# Patient Record
Sex: FEMALE | Race: WHITE | Employment: STUDENT | URBAN - METROPOLITAN AREA
[De-identification: names, ages, dates, MRNs, and addresses within clinical notes are randomized per-mention and may not be internally consistent; named-entity substitution may affect disease eponyms.]

---

## 2023-12-20 ENCOUNTER — TELEPHONE (OUTPATIENT)
Dept: RHEUMATOLOGY | Facility: CLINIC | Age: 23
End: 2023-12-20

## 2023-12-20 NOTE — TELEPHONE ENCOUNTER
Phoned Dr. Kerrie Barlow office to clarify reason for urgent referral to see Dr. Shaan Stephens. Message taken from [facility], will have provider office call our office to address.

## 2023-12-21 NOTE — TELEPHONE ENCOUNTER
Call back from Dr. Malia Weathers office. States that referral is non urgent. Reason for referral is body aches to midsection. Labs and imaging WNL.

## 2024-01-26 ENCOUNTER — OFFICE VISIT (OUTPATIENT)
Dept: RHEUMATOLOGY | Facility: CLINIC | Age: 24
End: 2024-01-26
Payer: COMMERCIAL

## 2024-01-26 VITALS
WEIGHT: 114.19 LBS | HEIGHT: 62.75 IN | OXYGEN SATURATION: 99 % | TEMPERATURE: 97 F | BODY MASS INDEX: 20.49 KG/M2 | HEART RATE: 90 BPM | RESPIRATION RATE: 16 BRPM | DIASTOLIC BLOOD PRESSURE: 68 MMHG | SYSTOLIC BLOOD PRESSURE: 110 MMHG

## 2024-01-26 DIAGNOSIS — M79.7 FIBROMYALGIA: ICD-10-CM

## 2024-01-26 DIAGNOSIS — Z86.19 FREQUENT INFECTIONS: ICD-10-CM

## 2024-01-26 DIAGNOSIS — Z72.820 POOR SLEEP: ICD-10-CM

## 2024-01-26 DIAGNOSIS — M25.50 POLYARTHRALGIA: Primary | ICD-10-CM

## 2024-01-26 DIAGNOSIS — E55.9 VITAMIN D DEFICIENCY: ICD-10-CM

## 2024-01-26 DIAGNOSIS — R53.82 CHRONIC FATIGUE: ICD-10-CM

## 2024-01-26 DIAGNOSIS — R29.3 POOR POSTURE: ICD-10-CM

## 2024-01-26 RX ORDER — BUPROPION HYDROCHLORIDE 150 MG/1
150 TABLET ORAL DAILY
COMMUNITY
Start: 2022-05-29 | End: 2024-01-26

## 2024-01-26 RX ORDER — ALBUTEROL SULFATE 90 UG/1
2 AEROSOL, METERED RESPIRATORY (INHALATION)
COMMUNITY
Start: 2023-11-27 | End: 2024-01-26

## 2024-01-26 RX ORDER — GABAPENTIN 300 MG/1
1 CAPSULE ORAL 3 TIMES DAILY
COMMUNITY
Start: 2023-02-17 | End: 2024-01-26

## 2024-01-26 RX ORDER — CYCLOBENZAPRINE HCL 10 MG
10 TABLET ORAL 2 TIMES DAILY PRN
COMMUNITY
Start: 2024-01-16

## 2024-01-26 RX ORDER — PREDNISONE 20 MG/1
20 TABLET ORAL 2 TIMES DAILY
COMMUNITY
Start: 2023-11-22 | End: 2024-01-26

## 2024-01-26 RX ORDER — NORETHINDRONE ACETATE AND ETHINYL ESTRADIOL 1MG-20(21)
KIT ORAL
COMMUNITY
End: 2024-01-26

## 2024-01-26 RX ORDER — FLUTICASONE PROPIONATE 50 MCG
SPRAY, SUSPENSION (ML) NASAL
COMMUNITY

## 2024-01-26 RX ORDER — GABAPENTIN 600 MG/1
1 TABLET ORAL 3 TIMES DAILY
COMMUNITY
Start: 2023-06-13 | End: 2024-01-26

## 2024-01-26 RX ORDER — PROPRANOLOL HYDROCHLORIDE 10 MG/1
10 TABLET ORAL 2 TIMES DAILY
COMMUNITY
Start: 2024-01-16

## 2024-01-26 RX ORDER — MELOXICAM 15 MG/1
15 TABLET ORAL
COMMUNITY
End: 2024-01-26

## 2024-01-26 RX ORDER — DEXTROAMPHETAMINE SACCHARATE, AMPHETAMINE ASPARTATE, DEXTROAMPHETAMINE SULFATE AND AMPHETAMINE SULFATE 5; 5; 5; 5 MG/1; MG/1; MG/1; MG/1
20 TABLET ORAL
COMMUNITY
Start: 2022-04-08

## 2024-01-26 RX ORDER — TOPIRAMATE 25 MG/1
25 TABLET ORAL NIGHTLY PRN
COMMUNITY
Start: 2023-06-13 | End: 2024-01-26

## 2024-01-26 RX ORDER — NAPROXEN 500 MG/1
500 TABLET ORAL
COMMUNITY
Start: 2023-12-18 | End: 2024-01-26

## 2024-01-26 NOTE — PROGRESS NOTES
?  RHEUMATOLOGY NEW PATIENT   Date of visit: 1/26/2024  ?  Chief Complaint   Patient presents with    New Patient     New patient referred by Derek Soto. Pt states at around 10 or 11 yrs of age she started having rt knee pain. She now has pain in both knees, hips, her spine and lt shoulder. She also has pain in her pinky and thumb on her lt hand. She had an EMG but was told it was normal. She has done PT with no relief. She states she also has a lot of fatigue. Pt is adopted and does not know her family history.        ?  ASSESSMENT, DISCUSSION & PLAN   Assessment:  1. Polyarthralgia    2. Chronic fatigue    3. Vitamin D deficiency    4. Fibromyalgia    5. Frequent infections    6. Poor posture    7. Poor sleep        Discussion:  Ms. Serina Fay is a 24 yo woman with long standing history of pain and fatigue. Seems like she has has negative work up in the past but does have areas of arthritis out of proportion for her age. She does have clear signs of fibromyalgia but discussed that this may not explain all of her symptoms.   Will have her get updated serologies as well as work up for the fatigue.   She would benefit from a sleep study.   She would also benefit from PT at least for posture training.   She is not currently on antidepressants so will consider this depending on test results. Would like to avoid gabapentin if able since previously required very high doses for improvement of symptoms.   She will need follow up to discuss result and next steps.     Discussed briefly the non medication treatment modalities for fibromyalgia.  Stressed importance of therapy as well as psych follow-up with medication intervention for any underlying depression/anxiety/PTSD.  Stressed importance of proper sleep of 6 to 8 hours nightly.  Reminded patient of healthy sleep hygiene.  Also stressed the importance of regular exercise including 30 minutes at least 5 times per week of stretching.  Exercises such as zoe chi can be  helpful for this and have been studied to be effective for fibromyalgia patients.  Dedicated physical therapy for fibromyalgia may be beneficial for patient.    Patient verbalized understanding of above instructions. No further questions at this time.    Code selection for this visit was based on time spent (60min) on date of service in preparing to see the patient, obtaining and/or reviewing separately obtained history, performing a medically appropriate examination, counseling and educating the patient/family/caregiver, ordering medications or testing, referring and communicating with other healthcare providers, documenting clinical information in the E HR, independently interpreting results and communicating results to the patient/family/caregiver and care coordination with the patient's other providers.    ?  Plan:  Diagnoses and all orders for this visit:    Polyarthralgia  -     CBC With Differential With Platelet; Future  -     Comp Metabolic Panel (14); Future  -     C-Reactive Protein; Future  -     Sed Rate, Westergren (Automated); Future  -     Vitamin D; Future  -     TSH W Reflex To Free T4; Future  -     Vitamin B12; Future  -     Iron And Tibc; Future  -     Ferritin; Future  -     Immunoglobulin A/G/M, Quant; Future  -     ANAlyzeR SERG, IFA with Reflex Titer/Pattern, Systemic Autoimmune Panel 1 [[id number]] [Q]    Chronic fatigue  -     CBC With Differential With Platelet; Future  -     Comp Metabolic Panel (14); Future  -     C-Reactive Protein; Future  -     Sed Rate, Westergren (Automated); Future  -     Vitamin D; Future  -     TSH W Reflex To Free T4; Future  -     Vitamin B12; Future  -     Iron And Tibc; Future  -     Ferritin; Future  -     Immunoglobulin A/G/M, Quant; Future  -     ANAlyzeR SERG, IFA with Reflex Titer/Pattern, Systemic Autoimmune Panel 1 [[id number]] [Q]    Vitamin D deficiency  -     Vitamin D; Future    Fibromyalgia    Frequent infections  -     Immunoglobulin A/G/M, Quant;  Future    Poor posture    Poor sleep        Return in about 2 weeks (around 2/9/2024).  ?  HPI   Serina Fay is a 23 year old female with the following active problems who is referred for rheumatologic evaluation due to pain.     Was hospitalized when sophomore in high school due to severity pain. Was given morphine and felt like pain had gone away and when out of system, pain returned.    States starting in 4th grade, she had sudden onset of pain in the right knee. Told at that time, was tendonitis. But had difficulty walking due to the pain. Would be crawling around her house. Did PT and had some improvement of ability to walk but no improvement of the pain    Then developed bilateral hip pain, left worse than the right  Both knees hurt and can get right knee swelling.     Did previously play water polo, gymnastics and drums in marching band for years.   States symptoms exacerbated with diet, exercise and mental health changes.    Left arm and left side of back nerve pain  + migraines (started about 2 years ago), can get numbness/tingling on the entire left side, even her tongue   + blurred vision and difficulty with eyes focusing- eye exams have been normal. MRI of brain normal except in the past scattered cortical intensities  + prior EEG showing cerebral dysfunction- after covid infection  + left shoulder/back neck pain- had MRI done and told by spine surgeon that pain not related to findings.   + sensitivity over the nose with light touch. Possible slight curvature of the nose but other work up negative. Some bruising over the bridge and possible swelling.   + left wrist pain and weakness     Has been dx with chronic fatigue and polyarthralgia.   Hx of covid infection in 2022 - getting diaphragm spasms, trouble swallowing and sensation of throat being tight, some pain with swallowing.     Works for [employer] and is on light duty (<10#). Tries to avoid repetitive motions.     + significant fatigue despite how much  she sleeps or drinks caffeine. Feels like she tires easily. Never wakes up feeling rest.   + gets injuries easily. Denies increased flexibility and feels the opposite.   + gets a throbbing in her legs primarily but felt all over.     Often cracks her shoulder/neck for relief  Also has TMJ but has not been able to afford the treatment . Hx of bruxism     + can get pinky and thumb swelling  + ankle with hx of rolling/trauma  Hx of trauma to the right foot.  Hx of costochondritis    Previously given gabapentin and helped. But concerned with taking long term and side effects. ( Was on 600mg TID, did not feel an improvement with the 300mg dosage).  Also previously given meloxicam, not currently taking. Not sure if made a difference.  Feels naproxen helped better when taking (took BID).   Would take a lot of ibuprofen and tylenol when younger due to the pain (took about 4-8/day)     Hx of depression, anxiety as well as C-PTSD. Was adopted around 4y. Was hospitalized after birth due to birth mother drug abuse.  encephalopathy from what she can gather from records.     + hair loss/thinning  + oral canker sores- improved, thinks related to food   + intermittent discoloration of the toes/feet  + gets cold easily   + constipation, never had formal GI workup   + hives over neck   + brittle nails   + dry eyes per pt (attributes to adderall), uses contact drops and allergy eye drops sometimes   + dry mouth  (attributes to adderall), uses mints   + gets a gagging response randomly- denies association with certain foods or anxiety   + gets sick easily   + LOC due to severity of pain     The patient denies nasal ulcers, photosensitive rash, elevated or scarring rashes, Raynaud's phenomenon, prior hematologic abnormality, prior renal or liver disease, or history of seizures.  Denies hx of pericarditis or pleuritis. ? Right axis deviation and \"extra chord\" per pt   No history of prior blood clot in the legs or lungs, strokes  or ischemic phenomenon.  Denies nonhealing ulcers on the fingertips.  Denies heart burn.  The patient denies any history of uveitis, crampy abdominal pain, bloating, diarrhea, bloody stools, nodular painful shin bruises, Achilles heel pain or symptoms of enthesitis, psoriatic lesions, spooning or pitting of the nails.      No fevers, chills, lymphadenopathy, night sweats, unexpected weight loss, easy bruising or bleeding, or unexplained weakness.      Family hx unknown since adopted     Past Medical History:  Past Medical History:   Diagnosis Date    ADHD     Costochondritis     Migraine     OCD (obsessive compulsive disorder)     PTSD (post-traumatic stress disorder)      Past Surgical History:  Past Surgical History:   Procedure Laterality Date    OTHER SURGICAL HISTORY      wisdom teeth     Family History:  History reviewed. No pertinent family history.  Social History:  Social History     Socioeconomic History    Marital status: Single   Tobacco Use    Smoking status: Never   Vaping Use    Vaping Use: Former    Substances: Nicotine   Substance and Sexual Activity    Alcohol use: Never    Drug use: Never     Medications:  Outpatient Medications Marked as Taking for the 1/26/24 encounter (Office Visit) with Anna Marie Elizabeth DO   Medication Sig Dispense Refill    amphetamine-dextroamphetamine 20 MG Oral Tab Take 1 tablet (20 mg total) by mouth.      cyclobenzaprine 10 MG Oral Tab Take 1 tablet (10 mg total) by mouth 2 (two) times daily as needed.      fluticasone propionate 50 MCG/ACT Nasal Suspension       propranolol 10 MG Oral Tab Take 1 tablet (10 mg total) by mouth 2 (two) times daily.      ISIBLOOM 0.15-30 MG-MCG Oral Tab        Modified Medications    No medications on file     Medications Discontinued During This Encounter   Medication Reason    albuterol 108 (90 Base) MCG/ACT Inhalation Aero Soln     buPROPion  MG Oral Tablet 24 Hr     gabapentin 300 MG Oral Cap     gabapentin 600 MG Oral Tab      Meloxicam 15 MG Oral Tab     naproxen 500 MG Oral Tab     Norethin Ace-Eth Estrad-FE 1-20 MG-MCG Oral Tab     predniSONE 20 MG Oral Tab     topiramate 25 MG Oral Tab      ?  ?  Allergies:  No Known Allergies  ?  REVIEW OF SYSTEMS   ?  Review of Systems   Constitutional:  Positive for chills and malaise/fatigue. Negative for fever and weight loss.   HENT:  Positive for congestion. Negative for nosebleeds.    Eyes:  Positive for blurred vision and pain. Negative for redness.   Respiratory:  Positive for shortness of breath. Negative for cough and hemoptysis.    Cardiovascular:  Positive for chest pain and palpitations. Negative for leg swelling.   Gastrointestinal:  Positive for constipation. Negative for abdominal pain, blood in stool, diarrhea, heartburn and nausea.   Genitourinary:  Positive for frequency. Negative for dysuria, hematuria and urgency.   Musculoskeletal:  Positive for back pain, joint pain, myalgias and neck pain.   Skin:  Negative for itching and rash.   Neurological:  Positive for dizziness, tingling, loss of consciousness, weakness and headaches. Negative for seizures.   Endo/Heme/Allergies:  Positive for environmental allergies. Does not bruise/bleed easily.   Psychiatric/Behavioral:  Positive for depression. The patient is nervous/anxious.      PHYSICAL EXAM   Today's Vitals:  Temperature Blood Pressure Heart Rate Resp Rate SpO2   Temp: 97.4 °F (36.3 °C) BP: 110/68 Pulse: 90 Resp: 16 SpO2: 99 %   ?  Current Weight Height BMI BSA Pain   Wt Readings from Last 1 Encounters:   01/26/24 114 lb 3.2 oz (51.8 kg)    Height: 5' 2.75\" (159.4 cm) Body mass index is 20.39 kg/m². Body surface area is 1.52 meters squared.         Physical Exam  Vitals and nursing note reviewed.   Constitutional:       General: She is not in acute distress.     Appearance: Normal appearance. She is well-developed. She is not diaphoretic.   HENT:      Head: Normocephalic.   Eyes:      General: No scleral icterus.      Conjunctiva/sclera: Conjunctivae normal.   Neck:      Vascular: No JVD.      Trachea: No tracheal deviation.   Cardiovascular:      Rate and Rhythm: Normal rate and regular rhythm.      Heart sounds: Normal heart sounds. No murmur heard.  Pulmonary:      Effort: Pulmonary effort is normal. No respiratory distress.      Breath sounds: Normal breath sounds. No wheezing.   Musculoskeletal:         General: Tenderness present. No swelling or deformity.      Cervical back: Neck supple.      Comments: No evidence of heberden or ann nodes of any of the fingers, no basilar joint tenderness of the 1st CMC bilaterally.  No swelling, tenderness, redness or restriction of motion of the DIPs, PIPs, MCPs, wrists, elbows, ankles, or joints of the feet.  Bilateral shoulders with full ROM, no evidence of impingement with provocative maneuvers.  Bilateral knees without medial joint line tenderness, no crepitus, no effusion.  Pan positive fibro tender points  Significant poor posture   Lymphadenopathy:      Cervical: No cervical adenopathy.   Skin:     General: Skin is warm and dry.      Findings: No erythema or rash.      Comments: No malar rash  No periungal erythema  No fingernail pitting   Neurological:      Mental Status: She is alert and oriented to person, place, and time.      Cranial Nerves: No cranial nerve deficit.      Gait: Gait normal.   Psychiatric:         Behavior: Behavior normal.       ?  Radiology review:      Martín Koch MD - 02/28/2023  Formatting of this note might be different from the original.  EXAM:  MRI CERVICAL SPINE WITHOUT IV CONTRAST, 2/28/2023 8:32 am    HISTORY:  Cervical radiculopathy.    COMPARISON:  None    TECHNIQUE:  Non-contrast multiplanar multi-sequence MRI of the cervical spine.    FINDINGS:  The craniovertebral junction and C1-C2 articulation show preserved alignment. Spinal alignment is maintained. Vertebral body heights are normal. No suspicious marrow lesions. No restricted  diffusion. Spinal cord is unremarkable. No high-grade narrowing of the spinal canal or neural foramina at any level.    Early disc degeneration at C5-C6 with minimal disc height loss and disc bulge. Minimal spinal canal stenosis at this level. Perivertebral soft tissues are within normal limits. Flow voids are preserved.    IMPRESSION:    Early disc degeneration at C5-C6 with minimal disc bulge at this level. No high-grade narrowing of the spinal canal or neural foramina at any level. The spinal cord is unremarkable.      Attending: Martín Koch on 02/28/2023 8:39 AM  Exam End: 02/28/23  8:32 AM    Specimen Collected: 02/28/23  8:32 AM      EXAM:   MRI SHOULDER LEFT WITHOUT IV CONTRAST, 2/28/2023 8:32 am     HISTORY:   Cervical radiculopathy  Radiculopathy, cervical region     COMPARISON:   Cervical radiculopathy.     TECHNIQUE:   Multi-planar multi-sequence images were obtained of the left shoulder without contrast.     FINDINGS:   No evidence of os acromiale or Hill-Sachs lesion.     Supraspinatus, infraspinatus, teres minor, and subscapularis tendons are intact.     Biceps tendon is located within the bicipital groove and appears intact.     Small osteophytes are present in the AC joint. No bursitis is seen.     No loss of muscle bulk or fatty atrophy of the rotator cuff musculature.     Labrum is not optimally evaluated on this non-arthrographic study; however, no gross labral abnormalities are seen.     No significant joint effusion.     Further evaluation of degenerative changes are better evaluated on same-day MRI cervical spine.     Examination: MRI of the right knee without IV contrast   Clinical indication: Right knee pain   Comparison: None   Technique: MRI of the right knee was performed with axial T2 fat saturated   sequence, sagittal T1, proton density, and T2 fat-saturated sequences, and   coronal T2 fat-saturated and proton density weighted sequences.   Findings: Patellofemoral articular cartilage is  mildly heterogenous at the   patellar apex, consistent with mild chondromalacia. The extensor mechanism of   the knee is intact. There is a small joint effusion. Mild edema is noted in the   anterior quadriceps fat pad. There is mild edema surrounding the infrapatellar   plica. A small amount of fluid is noted along the deep aspect of the patellar   tendon. No Blum's cyst is present.   The biceps femoris tendon, lateral ligamentous complex, popliteus tendon,   iliotibial band, ACL, PCL, and MCL are intact.   The medial meniscus is intact. Medial compartment articular cartilage is   unremarkable.   The lateral meniscus is intact. Lateral compartment articular cartilage is   unremarkable.   Impression:   1. Findings compatible with mild anterior quadriceps fat pad impingement and   mild infrapatellar plica sprain.   2. Mild chondromalacia patella.   3. Small joint effusion.   Dictating [place] 01/20/2020 07:03     Labs:  Lab Results   Component Value Date    WBC 7.34 03/05/2016    RBC 4.65 03/05/2016    HGB 12.7 03/05/2016    HCT 39.2 03/05/2016     03/05/2016    MCV 84.3 03/05/2016    MCH 27.3 03/05/2016    MCHC 32.4 03/05/2016    RDW 13.6 03/05/2016    NEPERCENT 57.3 03/05/2016    LYPERCENT 34.7 03/05/2016    MOPERCENT 5.3 03/05/2016    EOPERCENT 2.3 03/05/2016    BAPERCENT 0.4 03/05/2016    NE 4.20 03/05/2016    LYMABS 2.55 03/05/2016    MOABSO 0.39 03/05/2016    EOABSO 0.17 03/05/2016    BAABSO 0.03 03/05/2016     Lab Results   Component Value Date    GLU 85 03/05/2016    BUN 14 03/05/2016    CREATSERUM 0.60 03/05/2016    CA 9.3 03/05/2016    ALKPHO 110 03/05/2016    AST 18 03/05/2016    ALT 23 03/05/2016    BILT 0.23 03/05/2016    TP 7.8 03/05/2016    ALB 4.5 03/05/2016     03/05/2016    K 4.5 03/05/2016     03/05/2016    CO2 27 03/05/2016       Additional Labs:    12/2023  ESR 6 normal  CRP normal     02/2020  Uric acid 3.9 normal  ESR 6 normal   CRP normal   RF  negative  CCP negative  HLAB27 negative  Lyme negative     03/2016  RF negative  SERG screen negative  ESR 7 normal    Anna Marie Clara, DO  [facility]  1/26/2024

## 2024-02-05 LAB
% SATURATION: 24 % (CALC) (ref 16–45)
ABSOLUTE BASOPHILS: 48 CELLS/UL (ref 0–200)
ABSOLUTE EOSINOPHILS: 110 CELLS/UL (ref 15–500)
ABSOLUTE LYMPHOCYTES: 2429 CELLS/UL (ref 850–3900)
ABSOLUTE MONOCYTES: 559 CELLS/UL (ref 200–950)
ABSOLUTE NEUTROPHILS: 3754 CELLS/UL (ref 1500–7800)
ALBUMIN/GLOBULIN RATIO: 1.6 (CALC) (ref 1–2.5)
ALBUMIN: 4.3 G/DL (ref 3.6–5.1)
ALKALINE PHOSPHATASE: 51 U/L (ref 31–125)
ALT: 13 U/L (ref 6–29)
AST: 23 U/L (ref 10–30)
BASOPHILS: 0.7 %
BILIRUBIN, TOTAL: 0.3 MG/DL (ref 0.2–1.2)
BUN: 14 MG/DL (ref 7–25)
C-REACTIVE PROTEIN: 0.9 MG/L
CALCIUM: 9.2 MG/DL (ref 8.6–10.2)
CARBON DIOXIDE: 25 MMOL/L (ref 20–32)
CHLORIDE: 104 MMOL/L (ref 98–110)
CREATININE: 0.65 MG/DL (ref 0.5–0.96)
EGFR: 128 ML/MIN/1.73M2
EOSINOPHILS: 1.6 %
FERRITIN: 57 NG/ML (ref 16–154)
GLOBULIN: 2.7 G/DL (CALC) (ref 1.9–3.7)
GLUCOSE: 94 MG/DL (ref 65–139)
HEMATOCRIT: 36.5 % (ref 35–45)
HEMOGLOBIN: 12.6 G/DL (ref 11.7–15.5)
IMMUNOGLOBULIN A: 129 MG/DL (ref 47–310)
IMMUNOGLOBULIN G: 935 MG/DL (ref 600–1640)
IMMUNOGLOBULIN M: 127 MG/DL (ref 50–300)
IRON BINDING CAPACITY: 455 MCG/DL (CALC) (ref 250–450)
IRON, TOTAL: 108 MCG/DL (ref 40–190)
LYMPHOCYTES: 35.2 %
MCH: 29.4 PG (ref 27–33)
MCHC: 34.5 G/DL (ref 32–36)
MCV: 85.3 FL (ref 80–100)
MONOCYTES: 8.1 %
MPV: 11.2 FL (ref 7.5–12.5)
NEUTROPHILS: 54.4 %
PLATELET COUNT: 238 THOUSAND/UL (ref 140–400)
POTASSIUM: 3.5 MMOL/L (ref 3.5–5.3)
PROTEIN, TOTAL: 7 G/DL (ref 6.1–8.1)
RDW: 12.2 % (ref 11–15)
RED BLOOD CELL COUNT: 4.28 MILLION/UL (ref 3.8–5.1)
SED RATE BY MODIFIED$WESTERGREN: 2 MM/H
SODIUM: 137 MMOL/L (ref 135–146)
TSH W/REFLEX TO FT4: 0.98 MIU/L
VITAMIN B12: 471 PG/ML (ref 200–1100)
WHITE BLOOD CELL COUNT: 6.9 THOUSAND/UL (ref 3.8–10.8)

## 2024-08-05 ENCOUNTER — OFFICE VISIT (OUTPATIENT)
Dept: RHEUMATOLOGY | Facility: CLINIC | Age: 24
End: 2024-08-05
Payer: COMMERCIAL

## 2024-08-05 VITALS
SYSTOLIC BLOOD PRESSURE: 116 MMHG | OXYGEN SATURATION: 99 % | BODY MASS INDEX: 19.14 KG/M2 | HEART RATE: 84 BPM | RESPIRATION RATE: 16 BRPM | WEIGHT: 108 LBS | HEIGHT: 63 IN | TEMPERATURE: 98 F | DIASTOLIC BLOOD PRESSURE: 80 MMHG

## 2024-08-05 DIAGNOSIS — E55.9 VITAMIN D DEFICIENCY: ICD-10-CM

## 2024-08-05 DIAGNOSIS — R93.6 ABNORMAL X-RAY OF EXTREMITY: ICD-10-CM

## 2024-08-05 DIAGNOSIS — M79.601 RIGHT ARM PAIN: ICD-10-CM

## 2024-08-05 DIAGNOSIS — M25.50 POLYARTHRALGIA: ICD-10-CM

## 2024-08-05 DIAGNOSIS — M79.7 FIBROMYALGIA: Primary | ICD-10-CM

## 2024-08-05 DIAGNOSIS — R53.82 CHRONIC FATIGUE: ICD-10-CM

## 2024-08-05 PROCEDURE — 3079F DIAST BP 80-89 MM HG: CPT | Performed by: INTERNAL MEDICINE

## 2024-08-05 PROCEDURE — 3074F SYST BP LT 130 MM HG: CPT | Performed by: INTERNAL MEDICINE

## 2024-08-05 PROCEDURE — 3008F BODY MASS INDEX DOCD: CPT | Performed by: INTERNAL MEDICINE

## 2024-08-05 PROCEDURE — 99215 OFFICE O/P EST HI 40 MIN: CPT | Performed by: INTERNAL MEDICINE

## 2024-08-05 RX ORDER — ERGOCALCIFEROL 1.25 MG/1
50000 CAPSULE ORAL WEEKLY
Qty: 12 CAPSULE | Refills: 0 | Status: SHIPPED | OUTPATIENT
Start: 2024-08-05 | End: 2024-10-28

## 2024-08-05 NOTE — PROGRESS NOTES
?  RHEUMATOLOGY FOLLOW UP   Date of visit: 08/05/2024  ?  Chief Complaint   Patient presents with    Follow - Up     6 month f/u. Feeling about the same. Symptoms come and go. Now having some rashes that come and go as well. Converted rapid score of 4.3       ?  ASSESSMENT, DISCUSSION & PLAN   Assessment:  1. Fibromyalgia    2. Vitamin D deficiency    3. Polyarthralgia    4. Chronic fatigue    5. Right arm pain    6. Abnormal x-ray of extremity          Discussion:  Ms. Serina Fay is a 24 yo woman with long standing history of pain and fatigue. Seems like she has has negative work up in the past but does have areas of arthritis out of proportion for her age. She does have clear signs of fibromyalgia but discussed that this may not explain all of her symptoms.   Her serology showed an SERG that was positive but with a pattern that is seen in false positivity/normal population.  Remainder of JUSTINO panel was negative.  The remainder of her labs were also normal including inflammatory markers, complements, rheumatoid studies as well as inflammatory markers.  Reminded patient again that she likely suffers from fibromyalgia compounded by depression and anxiety which is likely contributing to at least some of her symptoms.  Does not account for the redness over her body in various points.  Question of Raynaud's versus erythromelalgia versus other etiology.  I strongly recommended that she consider treatment for the underlying mood disorder which may help with her chronic pain as well as fatigue.  She also has a horrible sleep schedule due to managing work and school.  Recommended she consider hydroxyzine which may help with some of the anxiety and her sleep versus fluoxetine which can help with some of the redness, mood disorder as well as potentially some of her pain.  Will send her a "[employer]t message with options for GI, ENT, neurology for evaluation of POTS/autonomic dysfunction as well as to orthopedic for her wrist  concerns.  She reports an abnormal x-ray of her humerus which has been there for some time so we will order specific humeral x-rays.    She will keep me updated after discussing her options further with her psychiatrist.   Otherwise, okay to follow up in 6-7 months or sooner as needed.     Discussed briefly the non medication treatment modalities for fibromyalgia.  Stressed importance of therapy as well as psych follow-up with medication intervention for any underlying depression/anxiety/PTSD.  Stressed importance of proper sleep of 6 to 8 hours nightly.  Reminded patient of healthy sleep hygiene.  Also stressed the importance of regular exercise including 30 minutes at least 5 times per week of stretching.  Exercises such as zoe chi can be helpful for this and have been studied to be effective for fibromyalgia patients.  Dedicated physical therapy for fibromyalgia may be beneficial for patient.    Patient verbalized understanding of above instructions. No further questions at this time.    Code selection for this visit was based on time spent (45min) on date of service in preparing to see the patient, obtaining and/or reviewing separately obtained history, performing a medically appropriate examination, counseling and educating the patient/family/caregiver, ordering medications or testing, referring and communicating with other healthcare providers, documenting clinical information in the E HR, independently interpreting results and communicating results to the patient/family/caregiver and care coordination with the patient's other providers.    ?  Plan:  Diagnoses and all orders for this visit:    Fibromyalgia  -     CBC With Differential With Platelet; Future  -     Comp Metabolic Panel (14); Future  -     C-Reactive Protein; Future  -     Sed Rate, Westergren (Automated); Future  -     Rheumatoid Arthritis Factor; Future  -     Myositis Antibody Comprehensive Panel [E]; Future  -     CK (Creatine Kinase) (Not  Creatinine); Future    Vitamin D deficiency  -     Vitamin D; Future  -     ergocalciferol 1.25 MG (34329 UT) Oral Cap; Take 1 capsule (50,000 Units total) by mouth once a week.    Polyarthralgia  -     CBC With Differential With Platelet; Future  -     Comp Metabolic Panel (14); Future  -     C-Reactive Protein; Future  -     Sed Rate, Westergren (Automated); Future  -     Rheumatoid Arthritis Factor; Future  -     Myositis Antibody Comprehensive Panel [E]; Future  -     CK (Creatine Kinase) (Not Creatinine); Future    Chronic fatigue  -     CBC With Differential With Platelet; Future  -     Comp Metabolic Panel (14); Future  -     C-Reactive Protein; Future  -     Sed Rate, Westergren (Automated); Future  -     Rheumatoid Arthritis Factor; Future  -     Myositis Antibody Comprehensive Panel [E]; Future  -     CK (Creatine Kinase) (Not Creatinine); Future    Right arm pain  -     XR HUMERUS (MIN 2 VIEWS), RIGHT (CPT=73060); Future    Abnormal x-ray of extremity  -     XR HUMERUS (MIN 2 VIEWS), RIGHT (CPT=73060); Future          Return in about 6 months (around 2/5/2025).  ?  HPI   Serina Fay is a 23 year old female with the following active problems who is referred for rheumatologic evaluation due to pain. Presents for follow up to discuss test results.    Since her last visit, she feels about the same.  Feels slightly worsened fatigue/tiredness and quality of sleep since stopping all supplements.   Under stress due to recent death of her father.   Continues to have pain in her leg/foot. Reminds me had a crush injury years ago and did not see the dr.   Has been exercising regularly outside of work but having issues with balancing and putting pressure on her foot. Following with podiatry and had recent MRI.   + possible raynauds  + redness/flushing in different areas like the face, anterior chest, knees and feet. Rarely the thumb when wrist pain/hand pain present  + issues with temperature control, can get  lightheaded, n/v. Seen by neurology but never formally dx with autonomic dysfunction or POTS    HPI from initial consultation  referred for rheumatologic evaluation due to pain.     Was hospitalized when sophomore in high school due to severity pain. Was given morphine and felt like pain had gone away and when out of system, pain returned.    States starting in 4th grade, she had sudden onset of pain in the right knee. Told at that time, was tendonitis. But had difficulty walking due to the pain. Would be crawling around her house. Did PT and had some improvement of ability to walk but no improvement of the pain    Then developed bilateral hip pain, left worse than the right  Both knees hurt and can get right knee swelling.     Did previously play water polo, gymnastics and drums in marching band for years.   States symptoms exacerbated with diet, exercise and mental health changes.    Left arm and left side of back nerve pain  + migraines (started about 2 years ago), can get numbness/tingling on the entire left side, even her tongue   + blurred vision and difficulty with eyes focusing- eye exams have been normal. MRI of brain normal except in the past scattered cortical intensities  + prior EEG showing cerebral dysfunction- after covid infection  + left shoulder/back neck pain- had MRI done and told by spine surgeon that pain not related to findings.   + sensitivity over the nose with light touch. Possible slight curvature of the nose but other work up negative. Some bruising over the bridge and possible swelling.   + left wrist pain and weakness     Has been dx with chronic fatigue and polyarthralgia.   Hx of covid infection in 2022 - getting diaphragm spasms, trouble swallowing and sensation of throat being tight, some pain with swallowing.     Works for [employer] and is on light duty (<10#). Tries to avoid repetitive motions.     + significant fatigue despite how much she sleeps or drinks caffeine. Feels like she  tires easily. Never wakes up feeling rest.   + gets injuries easily. Denies increased flexibility and feels the opposite.   + gets a throbbing in her legs primarily but felt all over.     Often cracks her shoulder/neck for relief  Also has TMJ but has not been able to afford the treatment . Hx of bruxism     + can get pinky and thumb swelling  + ankle with hx of rolling/trauma  Hx of trauma to the right foot.  Hx of costochondritis    Previously given gabapentin and helped. But concerned with taking long term and side effects. ( Was on 600mg TID, did not feel an improvement with the 300mg dosage).  Also previously given meloxicam, not currently taking. Not sure if made a difference.  Feels naproxen helped better when taking (took BID).   Would take a lot of ibuprofen and tylenol when younger due to the pain (took about 4-8/day)     Hx of depression, anxiety as well as C-PTSD. Was adopted around 4y. Was hospitalized after birth due to birth mother drug abuse.  encephalopathy from what she can gather from records.     + hair loss/thinning  + oral canker sores- improved, thinks related to food   + intermittent discoloration of the toes/feet  + gets cold easily   + constipation, never had formal GI workup   + hives over neck   + brittle nails   + dry eyes per pt (attributes to adderall), uses contact drops and allergy eye drops sometimes   + dry mouth  (attributes to adderall), uses mints   + gets a gagging response randomly- denies association with certain foods or anxiety   + gets sick easily   + LOC due to severity of pain     The patient denies nasal ulcers, photosensitive rash, elevated or scarring rashes, prior hematologic abnormality, prior renal or liver disease, or history of seizures.  Denies hx of pericarditis or pleuritis. ? Right axis deviation and \"extra chord\" per pt   No history of prior blood clot in the legs or lungs, strokes or ischemic phenomenon.  Denies nonhealing ulcers on the  fingertips.  Denies heart burn.  The patient denies any history of uveitis, crampy abdominal pain, bloating, diarrhea, bloody stools, nodular painful shin bruises, Achilles heel pain or symptoms of enthesitis, psoriatic lesions, spooning or pitting of the nails.      No fevers, chills, lymphadenopathy, night sweats, unexpected weight loss, easy bruising or bleeding, or unexplained weakness.      Family hx unknown since adopted     Past Medical History:  Past Medical History:    ADHD    Costochondritis    Migraine    OCD (obsessive compulsive disorder)    PTSD (post-traumatic stress disorder)     Past Surgical History:  Past Surgical History:   Procedure Laterality Date    Other surgical history      wisdom teeth     Family History:  No family history on file.  Social History:  Social History     Socioeconomic History    Marital status: Single   Tobacco Use    Smoking status: Never   Vaping Use    Vaping status: Former    Substances: Nicotine   Substance and Sexual Activity    Alcohol use: Never    Drug use: Never     Social Determinants of Health      [facility]    Housing Stability     Medications:  Outpatient Medications Marked as Taking for the 8/5/24 encounter (Office Visit) with Anna Marie Elizabeth DO   Medication Sig Dispense Refill    ergocalciferol 1.25 MG (46907 UT) Oral Cap Take 1 capsule (50,000 Units total) by mouth once a week. 12 capsule 0    amphetamine-dextroamphetamine 20 MG Oral Tab Take 1 tablet (20 mg total) by mouth.      cyclobenzaprine 10 MG Oral Tab Take 1 tablet (10 mg total) by mouth 2 (two) times daily as needed.      fluticasone propionate 50 MCG/ACT Nasal Suspension       propranolol 10 MG Oral Tab Take 1 tablet (10 mg total) by mouth 2 (two) times daily.       Modified Medications    No medications on file     Medications Discontinued During This Encounter   Medication Reason    ISIBLOOM 0.15-30 MG-MCG Oral Tab       ?  ?  Allergies:  No Known Allergies  ?  REVIEW OF SYSTEMS   ?  Review of Systems   Constitutional:  Positive for chills and malaise/fatigue. Negative for fever and weight loss.   HENT:  Positive for congestion. Negative for nosebleeds.    Eyes:  Positive for blurred vision and pain. Negative for redness.   Respiratory:  Positive for shortness of breath. Negative for cough and hemoptysis.    Cardiovascular:  Positive for chest pain and palpitations. Negative for leg swelling.   Gastrointestinal:  Positive for constipation. Negative for abdominal pain, blood in stool, diarrhea, heartburn and nausea.   Genitourinary:  Positive for frequency. Negative for dysuria, hematuria and urgency.   Musculoskeletal:  Positive for back pain, joint pain, myalgias and neck pain.   Skin:  Negative for itching and rash.   Neurological:  Positive for dizziness, tingling, loss of consciousness, weakness and headaches. Negative for seizures.   Endo/Heme/Allergies:  Positive for environmental allergies. Does not bruise/bleed easily.   Psychiatric/Behavioral:  Positive for depression. The patient is nervous/anxious.      PHYSICAL EXAM   Today's Vitals:  Temperature Blood Pressure Heart Rate Resp Rate SpO2   Temp: 98.2 °F (36.8 °C) BP: 116/80 Pulse: 84 Resp: 16 SpO2: 99 %   ?  Current Weight Height BMI BSA Pain   Wt Readings from Last 1 Encounters:   08/05/24 108 lb (49 kg)    Height: 5' 3\" (160 cm) Body mass index is 19.13 kg/m². Body surface area is 1.49 meters squared.         Physical Exam  Vitals and nursing note reviewed.   Constitutional:       General: She is not in acute distress.     Appearance: Normal appearance. She is well-developed. She is not diaphoretic.   HENT:      Head: Normocephalic.   Eyes:      General: No scleral icterus.     Conjunctiva/sclera: Conjunctivae normal.   Neck:      Vascular: No JVD.      Trachea: No tracheal deviation.   Pulmonary:      Effort: Pulmonary effort is normal. No respiratory distress.    Musculoskeletal:      Comments: (Prior exam)  No evidence of heberden or ann nodes of any of the fingers, no basilar joint tenderness of the 1st CMC bilaterally.  No swelling, tenderness, redness or restriction of motion of the DIPs, PIPs, MCPs, wrists, elbows, ankles, or joints of the feet.  Bilateral shoulders with full ROM, no evidence of impingement with provocative maneuvers.  Bilateral knees without medial joint line tenderness, no crepitus, no effusion.  Pan positive fibro tender points  Significant poor posture   Skin:     General: Skin is warm and dry.      Findings: No erythema or rash.      Comments: No malar rash  No periungal erythema  No fingernail pitting    Multiple pictures reviewed with redness of face, chest, knees and top of feet   Neurological:      Mental Status: She is alert and oriented to person, place, and time.      Cranial Nerves: No cranial nerve deficit.      Gait: Gait normal.   Psychiatric:         Mood and Affect: Mood normal.         Behavior: Behavior normal.       ?  Radiology review:      Martín Koch MD - 02/28/2023  Formatting of this note might be different from the original.  EXAM:  MRI CERVICAL SPINE WITHOUT IV CONTRAST, 2/28/2023 8:32 am    HISTORY:  Cervical radiculopathy.    COMPARISON:  None    TECHNIQUE:  Non-contrast multiplanar multi-sequence MRI of the cervical spine.    FINDINGS:  The craniovertebral junction and C1-C2 articulation show preserved alignment. Spinal alignment is maintained. Vertebral body heights are normal. No suspicious marrow lesions. No restricted diffusion. Spinal cord is unremarkable. No high-grade narrowing of the spinal canal or neural foramina at any level.    Early disc degeneration at C5-C6 with minimal disc height loss and disc bulge. Minimal spinal canal stenosis at this level. Perivertebral soft tissues are within normal limits. Flow voids are preserved.    IMPRESSION:    Early disc degeneration at C5-C6 with minimal disc  bulge at this level. No high-grade narrowing of the spinal canal or neural foramina at any level. The spinal cord is unremarkable.      Attending: Martín Koch on 02/28/2023 8:39 AM  Exam End: 02/28/23  8:32 AM    Specimen Collected: 02/28/23  8:32 AM      EXAM:   MRI SHOULDER LEFT WITHOUT IV CONTRAST, 2/28/2023 8:32 am     HISTORY:   Cervical radiculopathy  Radiculopathy, cervical region     COMPARISON:   Cervical radiculopathy.     TECHNIQUE:   Multi-planar multi-sequence images were obtained of the left shoulder without contrast.     FINDINGS:   No evidence of os acromiale or Hill-Sachs lesion.     Supraspinatus, infraspinatus, teres minor, and subscapularis tendons are intact.     Biceps tendon is located within the bicipital groove and appears intact.     Small osteophytes are present in the AC joint. No bursitis is seen.     No loss of muscle bulk or fatty atrophy of the rotator cuff musculature.     Labrum is not optimally evaluated on this non-arthrographic study; however, no gross labral abnormalities are seen.     No significant joint effusion.     Further evaluation of degenerative changes are better evaluated on same-day MRI cervical spine.     Examination: MRI of the right knee without IV contrast   Clinical indication: Right knee pain   Comparison: None   Technique: MRI of the right knee was performed with axial T2 fat saturated   sequence, sagittal T1, proton density, and T2 fat-saturated sequences, and   coronal T2 fat-saturated and proton density weighted sequences.   Findings: Patellofemoral articular cartilage is mildly heterogenous at the   patellar apex, consistent with mild chondromalacia. The extensor mechanism of   the knee is intact. There is a small joint effusion. Mild edema is noted in the   anterior quadriceps fat pad. There is mild edema surrounding the infrapatellar   plica. A small amount of fluid is noted along the deep aspect of the patellar   tendon. No Blum's cyst is present.   The  biceps femoris tendon, lateral ligamentous complex, popliteus tendon,   iliotibial band, ACL, PCL, and MCL are intact.   The medial meniscus is intact. Medial compartment articular cartilage is   unremarkable.   The lateral meniscus is intact. Lateral compartment articular cartilage is   unremarkable.   Impression:   1. Findings compatible with mild anterior quadriceps fat pad impingement and   mild infrapatellar plica sprain.   2. Mild chondromalacia patella.   3. Small joint effusion.   Dictating [place]   Dictated 01/20/2020 07:03     Labs:  Lab Results   Component Value Date    WBC 6.9 02/02/2024    RBC 4.28 02/02/2024    HGB 12.6 02/02/2024    HCT 36.5 02/02/2024     02/02/2024    MCV 85.3 02/02/2024    MCH 29.4 02/02/2024    MCHC 34.5 02/02/2024    RDW 12.2 02/02/2024    NEPERCENT 54.4 02/02/2024    LYPERCENT 35.2 02/02/2024    MOPERCENT 8.1 02/02/2024    EOPERCENT 1.6 02/02/2024    BAPERCENT 0.7 02/02/2024    NE 3,754 02/02/2024    LYMABS 2,429 02/02/2024    MOABSO 559 02/02/2024    EOABSO 110 02/02/2024    BAABSO 48 02/02/2024     Lab Results   Component Value Date    GLU 94 02/02/2024    BUN 14 02/02/2024    BUNCREA SEE NOTE: 02/02/2024    CREATSERUM 0.65 02/02/2024    CA 9.2 02/02/2024    ALKPHO 51 02/02/2024    AST 23 02/02/2024    ALT 13 02/02/2024    BILT 0.3 02/02/2024    TP 7.0 02/02/2024    ALB 4.3 02/02/2024    GLOBULIN 2.7 02/02/2024    AGRATIO 1.6 02/02/2024     02/02/2024    K 3.5 02/02/2024     02/02/2024    CO2 25 02/02/2024       Additional Labs:    07/2024   vitamin D 29 low  SERG 1: 320 nuclear dense fine speckled  dsDNA, chromatin, Bruce, Bruce/RNP, RNP, SSA, SSB, SCL 70, Laura 1, centromere negative  C3 103 normal  C4 20 normal  Cardiolipin IgA, IgG, IgM negative  Beta-2 glycoprotein IgA, IgG, IgM negative  RF IgA, IgG negative  RF IgM equivocal  MCV antibody negative  TPO antibody negative  CCP pending    12/2023  ESR 6 normal  CRP normal     02/2020  Uric  acid 3.9 normal  ESR 6 normal   CRP normal   RF negative  CCP negative  HLAB27 negative  Lyme negative     03/2016  RF negative  SERG screen negative  ESR 7 normal    Anna Marie DO [place]  [facility]  08/05/2024

## 2024-08-07 LAB
ANA SCREEN, IFA: POSITIVE
B2 GLYCOPROTEIN I (IGA)AB: <2 U/ML
B2 GLYCOPROTEIN I (IGG)AB: <2 U/ML
B2 GLYCOPROTEIN I(IGM)AB: <2 U/ML
CARDIOLIPIN AB (IGA): <2 APL-U/ML
CARDIOLIPIN AB (IGG): <2 GPL-U/ML
CARDIOLIPIN AB (IGM): <2 MPL-U/ML
COMPLEMENT COMPONENT C3C: 103 MG/DL (ref 83–193)
COMPLEMENT COMPONENT C4C: 20 MG/DL (ref 15–57)
CYCLIC CITRULLINATED$PEPTIDE (CCP) AB (IGG): <16 UNITS
DNA AB (DS) CRITHIDIA,IFA: NEGATIVE
MUTATED CITRULLINATED VIMENTIN (MCV) AB: <20 U/ML
RHEUMATOID FACTOR (IGA): <5 U
RHEUMATOID FACTOR (IGG): <5 U
RHEUMATOID FACTOR (IGM): 6 U
THYROID PEROXIDASE$ANTIBODIES: <1 IU/ML
VITAMIN D, 25-OH, TOTAL: 29 NG/ML (ref 30–100)

## 2024-10-05 ENCOUNTER — HOSPITAL ENCOUNTER (OUTPATIENT)
Dept: GENERAL RADIOLOGY | Age: 24
Discharge: HOME | End: 2024-10-05
Attending: INTERNAL MEDICINE
Payer: COMMERCIAL

## 2024-10-05 DIAGNOSIS — R93.6 ABNORMAL X-RAY OF EXTREMITY: ICD-10-CM

## 2024-10-05 DIAGNOSIS — M79.601 RIGHT ARM PAIN: ICD-10-CM

## 2024-10-05 PROCEDURE — 73060 X-RAY EXAM OF HUMERUS: CPT | Performed by: INTERNAL MEDICINE

## 2024-11-07 ENCOUNTER — LAB ENCOUNTER (OUTPATIENT)
Dept: LAB | Age: 24
End: 2024-11-07
Attending: INTERNAL MEDICINE
Payer: COMMERCIAL

## 2024-11-07 DIAGNOSIS — E55.9 VITAMIN D DEFICIENCY: ICD-10-CM

## 2024-11-07 LAB — VIT D+METAB SERPL-MCNC: 63.5 NG/ML (ref 30–100)

## 2024-11-07 PROCEDURE — 36415 COLL VENOUS BLD VENIPUNCTURE: CPT

## 2024-11-07 PROCEDURE — 82306 VITAMIN D 25 HYDROXY: CPT

## 2024-11-13 ENCOUNTER — LAB ENCOUNTER (OUTPATIENT)
Dept: LAB | Facility: HOSPITAL | Age: 24
End: 2024-11-13
Attending: OTOLARYNGOLOGY
Payer: COMMERCIAL

## 2024-11-13 ENCOUNTER — OFFICE VISIT (OUTPATIENT)
Dept: OTOLARYNGOLOGY | Facility: CLINIC | Age: 24
End: 2024-11-13
Payer: COMMERCIAL

## 2024-11-13 VITALS — HEIGHT: 63 IN | WEIGHT: 105 LBS | BODY MASS INDEX: 18.61 KG/M2

## 2024-11-13 DIAGNOSIS — R68.2 DRY MOUTH: ICD-10-CM

## 2024-11-13 DIAGNOSIS — J34.89 THICK NASAL MUCUS: ICD-10-CM

## 2024-11-13 DIAGNOSIS — R68.2 DRY MOUTH: Primary | ICD-10-CM

## 2024-11-13 DIAGNOSIS — R23.3 EASY BRUISING: ICD-10-CM

## 2024-11-13 PROCEDURE — 36415 COLL VENOUS BLD VENIPUNCTURE: CPT

## 2024-11-13 PROCEDURE — 31575 DIAGNOSTIC LARYNGOSCOPY: CPT | Performed by: OTOLARYNGOLOGY

## 2024-11-13 PROCEDURE — 99203 OFFICE O/P NEW LOW 30 MIN: CPT | Performed by: OTOLARYNGOLOGY

## 2024-11-13 PROCEDURE — 86037 ANCA TITER EACH ANTIBODY: CPT

## 2024-11-13 PROCEDURE — 83516 IMMUNOASSAY NONANTIBODY: CPT

## 2024-11-13 PROCEDURE — 3008F BODY MASS INDEX DOCD: CPT | Performed by: OTOLARYNGOLOGY

## 2024-11-13 RX ORDER — HYDROXYZINE HYDROCHLORIDE 25 MG/1
25 TABLET, FILM COATED ORAL 2 TIMES DAILY
COMMUNITY
Start: 2024-08-13

## 2024-11-13 NOTE — PROGRESS NOTES
NEW PATIENT PROGRESS NOTE  OTOLOGY/OTOLARYNGOLOGY    REF MD:  No referring provider defined for this encounter.     PCP: Adela Soto    CHIEF COMPLAINT:    Chief Complaint   Patient presents with    New Patient    Nose Problem     Patient reports nose pain, excessive mucus and difficulty breathing.    Swallowing     Patient reports difficulty swallowing.       HISTORY OF PRESENT ILLNESS: Serina Fay is a 24 year old female who presents for evaluation of nasal pain.The patient reports chronic pain in the specified area. She denies any history of nasal fractures or similar injuries. She has been told on multiple occasions that she exhibits bruising in this area and has a history of sleeping face down, sometimes waking up with a cracking sensation. She is under the care of Dr. Rogel, a rheumatologist, for an autoimmune condition that is still under investigation. The patient also has stiffness and arthritis in her spine and AC joint and bruises easily. She experiences episodes of coughing up blood mixed with mucus multiple times a month. Notably, the patient is adopted.      PAST MEDICAL HISTORY:    Past Medical History:    ADHD    Costochondritis    Migraine    OCD (obsessive compulsive disorder)    PTSD (post-traumatic stress disorder)       PAST SURGICAL HISTORY:    Past Surgical History:   Procedure Laterality Date    Other surgical history      wisdom teeth       Medications Ordered Prior to Encounter[1]    Allergies: Allergies[2]    SOCIAL HISTORY:    Social History     Tobacco Use    Smoking status: Never    Smokeless tobacco: Never   Substance Use Topics    Alcohol use: Not Currently       FAMILY HISTORY: Denies known family history of hearing loss, tinnitus, vertigo, or migraine.  Denies known family history of head and neck cancer, thyroid cancer, bleeding disorders.     REVIEW OF SYSTEMS:   Positives are in bold  Neuro: Headache, facial weakness, facial numbness, neck pain, vertigo  ENT: Hearing  change, tinnitus, otorrhea, otalgia, aural fullness, ear pressure, vertigo, imbalance  Sinus pressure, rhinorrhea, congestion, facial pain, jaw pain, dysphagia, odynophagia, sore throat, voice changes, shortness of breath    EXAMINATION:  I washed my hands with an alcohol-based hand gel prior to examination  Constitutional:   --Vitals: Height 5' 3\" (1.6 m), weight 105 lb (47.6 kg).  General: no apparent distress, well-developed, conversant  Psych: affect pleasant and appropriate for age, alert and oriented  Neuro: Cranial nerves: EOMI, Facial sensation intact to touch, palate elevates midline, tongue protrudes midline, shoulder shrug intact bilateral, facial movement normal bilateral  Respiratory: No stridor, stertor or increased work of breathing  ENT:  --Nose: no external nasal deformity - bruising is noted over the nasal dorsum where her mask is in contact, anterior rhinoscopy: Septum midline, no inferior turbinate hypertrophy, mucosa healthy, no rhinorrhea. Rightward nasal septal deviation.  --OC/OP: No trismus. No masses or lesions noted over the gingiva, Normal but dry appearance to the mucosa, tongue, FOM, hard/soft palate, tonsillar pillars, posterior pharyngeal wall. Tonsils are 1+ and soft. FOM/BOT are soft.   --Neck: No palpable cervical lymphadenopathy, no thyromegaly, no masses or lesions over the bilateral submandibular or parotid glands  --Ear: (bilateral ears were examined under binocular microscopy)  Right ear microscopic exam:  Pinna: Normal, no lesions or masses.  Mastoid: Nontender on palpation.   External auditory canal: Clear, no masses or lesions.  Tympanic membrane: Intact, no lesions, normal landmarks.  Middle ear: Aerated.    Left ear microscopic exam:  Pinna: Normal, no lesions or masses.  Mastoid: Nontender on palpation.   External auditory canal: Clear, no masses or lesions.  Tympanic membrane: Intact, no lesions, normal landmarks.  Middle ear: Aerated.     Flexible fiberoptic  laryngoscopy (date 11/13/24)  Informed consent obtained, patient was correctly identified  Topical anesthesia with aerosolized lidocaine and ephedrine spray in the bilateral nares  Findings: The flexible scope was advanced into the bilateral nares via the inferior meatus into the nasopharynx. No masses or lesions noted in the bilateral inferior meatus. The bilateral eustachian tubes are patent. No masses or lesions in the bilateral nasopharynx or fossae of Rosenmueller. No masses or lesions in the oropharynx, hypopharynx, supraglottis, glottis, subglottis. Normal TVF motion bilaterally in adduction and abduction. Notable interarytenoid edema/erythema. Thick secretions which were suctioned in oral cavity. Normal but dry appearance to the mucosa. Rightward nasal septal deviation.  Complications none, procedure well tolerated.    ASSESSMENT/PLAN:  Serina Fay is a 24 year old female with     ICD-10-CM   1. Dry mouth  R68.2   2. Thick nasal mucus  J34.89   3. Easy bruising  R23.3        IMPRESSION:  -Discussed with patient that she seems to have easy bruising on the bridge of her nose, which may be related to an underlying condition. Patient overall has easy bruising, possible hypermobility, dry mouth, possible dysautonomia/POTS, joint pain, migraine, costochondritis and intermittent coughing with a small amount of blood (specks).   Rightward nasal septal deviation    PLAN:  -Discussed with patient investigating if genetic testing may be use in better understanding her condition  -Reviewed autoimmune lab results  -Vasculitis labs ordered  -Very thick nasal mucus with dry mouth, dry nasal mucosa. Consider minor salivary gland biopsy.   -Follow-up as needed     Situation reviewed with the patient in detail.    Attention: This note has been scribed by Britney Campbell under the supervision of Rosalio Carroll MD.     Rosalio Carroll MD  Otology/Otolaryngology  [address]  [place] [address] [place] [id number]  Phone [phone]  Fax [phone]      I have personally performed the services described in this documentation. All medical record entries made by the scribe were at my direction and in my presence. I have reviewed the chart and agree that the medical record reflects my personal performance and is accurate and complete.           [1]   Current Outpatient Medications on File Prior to Visit   Medication Sig Dispense Refill    hydrOXYzine 25 MG Oral Tab Take 1 tablet (25 mg total) by mouth 2 (two) times daily.      amphetamine-dextroamphetamine 20 MG Oral Tab Take 1 tablet (20 mg total) by mouth.      cyclobenzaprine 10 MG Oral Tab Take 1 tablet (10 mg total) by mouth 2 (two) times daily as needed.      fluticasone propionate 50 MCG/ACT Nasal Suspension       propranolol 10 MG Oral Tab Take 1 tablet (10 mg total) by mouth 2 (two) times daily.       No current facility-administered medications on file prior to visit.   [2] No Known Allergies

## 2024-11-15 LAB
ANTI-MPO ANTIBODIES: <0.2 UNITS
ANTI-PR3 ANTIBODIES: <0.2 UNITS

## 2024-12-26 ENCOUNTER — TELEPHONE (OUTPATIENT)
Facility: CLINIC | Age: 24
End: 2024-12-26

## 2024-12-26 ENCOUNTER — LAB ENCOUNTER (OUTPATIENT)
Dept: LAB | Facility: HOSPITAL | Age: 24
End: 2024-12-26
Payer: COMMERCIAL

## 2024-12-26 ENCOUNTER — OFFICE VISIT (OUTPATIENT)
Facility: CLINIC | Age: 24
End: 2024-12-26
Payer: COMMERCIAL

## 2024-12-26 VITALS
DIASTOLIC BLOOD PRESSURE: 80 MMHG | HEART RATE: 83 BPM | HEIGHT: 63 IN | SYSTOLIC BLOOD PRESSURE: 120 MMHG | WEIGHT: 107 LBS | BODY MASS INDEX: 18.96 KG/M2

## 2024-12-26 DIAGNOSIS — R13.10 ODYNOPHAGIA: Primary | ICD-10-CM

## 2024-12-26 DIAGNOSIS — K21.9 GASTROESOPHAGEAL REFLUX DISEASE, UNSPECIFIED WHETHER ESOPHAGITIS PRESENT: ICD-10-CM

## 2024-12-26 DIAGNOSIS — K59.00 CONSTIPATION, UNSPECIFIED CONSTIPATION TYPE: ICD-10-CM

## 2024-12-26 DIAGNOSIS — R13.12 OROPHARYNGEAL DYSPHAGIA: Primary | ICD-10-CM

## 2024-12-26 DIAGNOSIS — R07.89 CHEST TIGHTNESS: ICD-10-CM

## 2024-12-26 DIAGNOSIS — R13.19 ESOPHAGEAL DYSPHAGIA: ICD-10-CM

## 2024-12-26 DIAGNOSIS — R13.10 ODYNOPHAGIA: ICD-10-CM

## 2024-12-26 DIAGNOSIS — R11.2 NAUSEA AND VOMITING, UNSPECIFIED VOMITING TYPE: ICD-10-CM

## 2024-12-26 DIAGNOSIS — K62.5 BRBPR (BRIGHT RED BLOOD PER RECTUM): ICD-10-CM

## 2024-12-26 PROCEDURE — 83013 H PYLORI (C-13) BREATH: CPT

## 2024-12-26 PROCEDURE — 3079F DIAST BP 80-89 MM HG: CPT

## 2024-12-26 PROCEDURE — 3008F BODY MASS INDEX DOCD: CPT

## 2024-12-26 PROCEDURE — 99204 OFFICE O/P NEW MOD 45 MIN: CPT

## 2024-12-26 PROCEDURE — 3074F SYST BP LT 130 MM HG: CPT

## 2024-12-26 RX ORDER — OMEPRAZOLE 40 MG/1
40 CAPSULE, DELAYED RELEASE ORAL DAILY
Qty: 90 CAPSULE | Refills: 0 | Status: SHIPPED | OUTPATIENT
Start: 2024-12-26 | End: 2025-03-26

## 2024-12-26 RX ORDER — ACETAMINOPHEN 160 MG
2000 TABLET,DISINTEGRATING ORAL DAILY
COMMUNITY

## 2024-12-26 NOTE — TELEPHONE ENCOUNTER
Scheduled for:  EGD 45[address]  Provider Name:  Dr. Cee  Date:  1/10/2025  Location:  [facility]  Sedation:  MAC  Time:  12:30pm, pt is aware eosc will call with arrival time  Prep:  NPO  Meds/Allergies Reconciled?:  Physician reviewed     Diagnosis with codes:  dysphagia R13.10, odynophagia R13.10, GERD K21.9  Was patient informed to call insurance with codes (Y/N):  Yes     Referral sent?:  Referral was sent at the time of electronic surgical scheduling.   [facility] or [facility] notified?:  I sent an electronic request to Endo Scheduling and received a confirmation today.      Medication Orders:  no ADHD medications 48 hours prior procedure   Misc Orders:  n/a     Further instructions given by staff:   Patient received instructions in office

## 2024-12-26 NOTE — H&P
[facility] - Gastroenterology                                                                                                               Reason for consult: dysphagia    Requesting physician or provider: Adela Soto    Chief Complaint   Patient presents with    Gastro-esophageal Reflux    Swallowing Problem       HPI:   Serina Fay is a 24 year old year-old female with active diagnoses including ADHD, OCD, PTSD. Prior medical/surgical history in note table.    she is here today for evaluation  #odynophagia  #dysphagia  #chest tightness  #GERD  #vomiting  -reports for 1.5 years having symptoms of dysphagia and odynophagia. Reports daily dysphagia with liquids, solids and pills. Described as the proximal esophagus not relaxing to allow passage of solids/liquids. Also has odynophagia more sporadic, occurs maybe once a month.   -reports mostly constant chest tightness described as necessity to belch but unable to do so. Sometimes better with over the counter gas medication.  -frequent food or acid regurgitation and reports feeling like if she bends over her food would all regurgitate up. Frequent nausea which is somewhat better taking cimetidine nightly  -reports making an involuntary sound, can occur on empty sotmach but more frequent after eating. She is unsure if originating from stomach like a belch or from vocal cords  -saw ENT and has laryngoscope and was told larynx is red & swollen. \"Notable interarytenoid edema/erythema \"  -reports she has seen her psychiatrist and neurologist and they don't feel like this related to mental and neurological diagnoses  -she is currently being followed by rheumatology for work up of possible autoimmune condition    #constipation  #BRBPR  -reports chronic constipation, intermittent diarrhea. Eats certain foods such as excess sweet potato or blueberries to help her have bowel  movement  -rare blood on tissue after straining to have bowel movement    Patient denies symptoms of nausea, vomiting, globus sensation, hematemesis, abdominal pain, change in bowel habits, or melena. she denies recent change in appetite, fever or unintentional weight loss.      Last colonoscopy: none   Last EGD: none     NSAIDS: none   Tobacco: none   Alcohol: none   Marijuana: none   Illicit drugs: none     FH GI malignancy: unknown, adopted   FH IBD:  unknown, adopted     No history of adverse reaction to sedation  No DEBBIE  No anticoagulants/antiplatelet  No pacemaker/defibrillator    Wt Readings from Last 6 Encounters:   12/26/24 107 lb (48.5 kg)   11/13/24 105 lb (47.6 kg)   08/05/24 108 lb (49 kg)   01/26/24 114 lb 3.2 oz (51.8 kg)   03/05/16 106 lb (48.1 kg) (51%, Z= 0.02)*   07/17/13 77 lb (34.9 kg) (39%, Z= -0.27)*     * Growth percentiles are based on [facility] (Girls, 2-20 Years) data.        History, Medications, Allergies, ROS:      Past Medical History:    ADHD    Costochondritis    Migraine    OCD (obsessive compulsive disorder)    PTSD (post-traumatic stress disorder)      Past Surgical History:   Procedure Laterality Date    Other surgical history      wisdom teeth      Family Hx:   Family History   Adopted: Yes      Social History:   Social History     Socioeconomic History    Marital status: Single   Tobacco Use    Smoking status: Never    Smokeless tobacco: Never   Vaping Use    Vaping status: Former    Substances: Nicotine   Substance and Sexual Activity    Alcohol use: Not Currently    Drug use: Never     Social Drivers of Health      Received from [facility], [facility]    Housing Stability        Medications (Active prior to today's visit):  Current Outpatient Medications   Medication Sig Dispense Refill    cholecalciferol 50 MCG (2000 UT) Oral Cap Take 1 capsule (2,000 Units total) by mouth daily.      Omeprazole 40 MG Oral Capsule Delayed Release Take 1  capsule (40 mg total) by mouth daily. Take 1 capsule by mouth daily before breakfast. 90 capsule 0    hydrOXYzine 25 MG Oral Tab Take 1 tablet (25 mg total) by mouth 2 (two) times daily.      amphetamine-dextroamphetamine 20 MG Oral Tab Take 1 tablet (20 mg total) by mouth.      fluticasone propionate 50 MCG/ACT Nasal Suspension       propranolol 10 MG Oral Tab Take 1 tablet (10 mg total) by mouth 2 (two) times daily.         Allergies:  Allergies[1]    ROS:   CONSTITUTIONAL: negative for fevers, chills, sweats  EYES Negative for scleral icterus or redness, and diplopia  HEENT: Negative for hoarseness  RESPIRATORY: Negative for cough and severe shortness of breath  CARDIOVASCULAR: Negative for crushing sub-sternal chest pain  GASTROINTESTINAL: See HPI  GENITOURINARY: Negative for dysuria  MUSCULOSKELETAL: Negative for arthralgias and myalgias  SKIN: Negative for jaundice, rash or pruritus  NEUROLOGICAL: Negative for dizziness and headaches  BEHAVIOR/PSYCH: Negative for psychotic behavior    PHYSICAL EXAM:   Blood pressure 120/80, pulse 83, height 5' 3\" (1.6 m), weight 107 lb (48.5 kg), last menstrual period 12/20/2024.    GEN: Alert, no acute distress, well-nourished   HEENT: anicteric sclera, neck supple, trachea midline, MMM, no palpable or tender neck or supraclavicular lymph nodes  CV: RRR, the extremities are warm and well perfused   LUNGS: No increased work of breathing, CTAB  ABDOMEN: +tender epigastric & periumbilical. Soft, symmetrical, without distention or guarding. No scars or lesions. Aorta is without bruit or visible pulsation. Umbilicus is midline without herniation. Normoactive bowel sounds are present, No masses, hepatomegaly or splenomegaly noted.  MSK: No erythema, no warmth, no swelling of joints  SKIN: No jaundice, no erythema, no rashes, no lesions  HEMATOLOGIC: No bleeding, no bruising  NEURO: Alert and interactive, PRATER  PSYCH: appropriate mood & affect    Labs/Imaging/Procedures:      Patient's pertinent labs and imaging were reviewed and discussed with patient today.        .  ASSESSMENT/PLAN:   Serina Fay is a 24 year old year-old female with active diagnoses including ADHD, OCD, PTSD. Prior medical/surgical history in note table.    she is here today for evaluation  #odynophagia  #dysphagia  #chest tightness  #GERD  #vomiting  -reports for 1.5 years having symptoms of dysphagia and odynophagia. Reports daily dysphagia with liquids, solids and pills. Described as the proximal esophagus not relaxing to allow passage of solids/liquids. Also has odynophagia more sporadic, occurs maybe once a month.   -reports mostly constant chest tightness described as necessity to belch but unable to do so. Sometimes better with over the counter gas medication.  -frequent food or acid regurgitation and reports feeling like if she bends over her food would all regurgitate up. Frequent nausea which is somewhat better taking cimetidine nightly  -reports making an involuntary sound, can occur on empty sotmach but more frequent after eating. She is unsure if originating from stomach like a belch or from vocal cords  -saw ENT and has laryngoscope and was told larynx is red & swollen. \"Notable interarytenoid edema/erythema \"  -reports she has seen her psychiatrist and neurologist and they don't feel like this related to mental and neurological diagnoses  -she is currently being followed by rheumatology for work up of possible autoimmune condition  -tender epigastric & periumbilical on exam.   -etiology possibly esophageal motility issue, GERD, gastritis, esophagitis, EOE, gastric outlet obstruction. Will test for H. Pylori. PPI trial, Imaging swallow study and EGD to assess to above etiologies and others. Possible symptoms related to undiagnosed rheumatologic condition    #constipation  #BRBPR  -reports chronic constipation, intermittent diarrhea. Eats certain foods such as excess sweet potato or blueberries  to help her have bowel movement  -rare blood on tissue after straining to have bowel movement  -recommend miralax daily as needed    Recommendations:  -for chronic constipation can start taking miralax daily. I recommend 1 capful daily. Can increase or decrease from there.     -go to lab for H. Pylori test     -stop taking Cimetidine, instead take omeprazole 40mg before bed     -go   -call to schedule BOTH. #[phone]  Xray esophagus  Video swallow study     -follow up 1 month after EGD      1. Schedule upper endoscopy (EGD) with [facility] [place] [facility]opist [Diagnosis: dysphagia, odynophagia, GERD]    Medication Changes: no ADHD medications 48 hours prior procedure    Endoscopy risk/benefit discussion: I have thoroughly discussed the risks, benefits, and alternatives of endoscopic evaluation with the patient, who demonstrated understanding. This includes the potential risks of bleeding, infection, pain, anesthesia complications, and perforation, which may result in prolonged hospitalization or surgical intervention. All of the patient’s questions were addressed to their satisfaction. The patient has chosen to proceed with the endoscopic procedure, including any necessary interventions such as polypectomy, biopsy, control of bleeding.            Orders This Visit:  Orders Placed This Encounter   Procedures    Helicobacter Pylori Breath Test, Adult       Meds This Visit:  Requested Prescriptions     Signed Prescriptions Disp Refills    Omeprazole 40 MG Oral Capsule Delayed Release 90 capsule 0     Sig: Take 1 capsule (40 mg total) by mouth daily. Take 1 capsule by mouth daily before breakfast.       Imaging & Referrals:  XR UGI/ESOPHAGUS DOUBLE CONTRAST (CPT=74246)  XR VIDEO SWALLOW (FPZ=[id number])      MALINA Chavez    [facility] Gastroenterology  12/26/2024        This note was partially prepared using Dragon Medical voice recognition dictation software. As a result, errors may occur. When identified, these  errors have been corrected. While every attempt is made to correct errors during dictation, discrepancies may still exist.        [1] No Known Allergies

## 2024-12-26 NOTE — TELEPHONE ENCOUNTER
1. Schedule upper endoscopy (EGD) with [facility] [place] [facility]oscopist [Diagnosis: dysphagia, odynophagia, GERD]     Medication Changes: no ADHD medications 48 hours prior procedure     2. If you start any NEW medication after your visit today, please notify us. Certain medications will need to be held before the procedure, or the procedure cannot be performed safely.      3. DO NOT TAKE: Iron (ferrous sulfate/ ferrous gluconate) pills, herbal supplements, multivitamins, or diet medications (i.e. Phentermine/Vyvanse) for 7 days before exam.  DO NOT TAKE: Any form of alcohol, recreational drugs and any forms of Erectile Dysfunction medications 24 hours prior to procedure.     4. The day BEFORE your procedure, NOTHING TO EAT OR DRINK AFTER MIDNIGHT! If your procedure is scheduled in the afternoon, you may have clear liquids only up to 3 hours before the time of your procedure. If you fail to keep your stomach empty for 3 hours prior to procedure time, your procedure may be CANCELLED. Instructions can also be found at: [email or web address]

## 2024-12-26 NOTE — PATIENT INSTRUCTIONS
-for chronic constipation can start taking miralax daily. I recommend 1 capful daily. Can increase or decrease from there.     -go to lab for H. Pylori test     -stop taking Cimetidine, instead take omeprazole 40mg before bed     -go   -call to schedule BOTH. #[phone]  Xray esophagus  Video swallow study     -follow up 1 month after EGD      1. Schedule upper endoscopy (EGD) with [facility] [place] Endoscopist [Diagnosis: dysphagia, odynophagia, GERD]    Medication Changes: no ADHD medications 48 hours prior procedure    2. If you start any NEW medication after your visit today, please notify us. Certain medications will need to be held before the procedure, or the procedure cannot be performed safely.     3. DO NOT TAKE: Iron (ferrous sulfate/ ferrous gluconate) pills, herbal supplements, multivitamins, or diet medications (i.e. Phentermine/Vyvanse) for 7 days before exam.  DO NOT TAKE: Any form of alcohol, recreational drugs and any forms of Erectile Dysfunction medications 24 hours prior to procedure.    4. The day BEFORE your procedure, NOTHING TO EAT OR DRINK AFTER MIDNIGHT! If your procedure is scheduled in the afternoon, you may have clear liquids only up to 3 hours before the time of your procedure. If you fail to keep your stomach empty for 3 hours prior to procedure time, your procedure may be CANCELLED. Instructions can also be found at: [email or web address]

## 2024-12-27 LAB — H PYLORI BREATH TEST: NEGATIVE

## 2024-12-30 ENCOUNTER — HOSPITAL ENCOUNTER (OUTPATIENT)
Dept: GENERAL RADIOLOGY | Age: 24
Discharge: HOME | End: 2024-12-30
Payer: COMMERCIAL

## 2024-12-30 DIAGNOSIS — R13.12 OROPHARYNGEAL DYSPHAGIA: ICD-10-CM

## 2024-12-30 DIAGNOSIS — R11.2 NAUSEA AND VOMITING, UNSPECIFIED VOMITING TYPE: ICD-10-CM

## 2024-12-30 DIAGNOSIS — R13.10 ODYNOPHAGIA: ICD-10-CM

## 2024-12-30 DIAGNOSIS — K21.9 GASTROESOPHAGEAL REFLUX DISEASE, UNSPECIFIED WHETHER ESOPHAGITIS PRESENT: ICD-10-CM

## 2024-12-30 PROCEDURE — 74246 X-RAY XM UPR GI TRC 2CNTRST: CPT

## 2024-12-31 ENCOUNTER — HOSPITAL ENCOUNTER (OUTPATIENT)
Dept: GENERAL RADIOLOGY | Facility: HOSPITAL | Age: 24
Discharge: HOME | End: 2024-12-31
Payer: COMMERCIAL

## 2024-12-31 DIAGNOSIS — R11.2 NAUSEA AND VOMITING, UNSPECIFIED VOMITING TYPE: ICD-10-CM

## 2024-12-31 DIAGNOSIS — R13.12 OROPHARYNGEAL DYSPHAGIA: ICD-10-CM

## 2024-12-31 DIAGNOSIS — R13.10 ODYNOPHAGIA: ICD-10-CM

## 2024-12-31 DIAGNOSIS — K21.9 GASTROESOPHAGEAL REFLUX DISEASE, UNSPECIFIED WHETHER ESOPHAGITIS PRESENT: ICD-10-CM

## 2024-12-31 PROCEDURE — 92611 MOTION FLUOROSCOPY/SWALLOW: CPT

## 2024-12-31 PROCEDURE — 74230 X-RAY XM SWLNG FUNCJ C+: CPT

## 2024-12-31 NOTE — PROGRESS NOTES
ADULT VIDEOFLUOROSCOPIC SWALLOWING STUDY    Admission Date: 12/31/2024  Evaluation Date: 12/31/24  Radiologist: Hosea    RECOMMENDATIONS   Diet Recommendations - Solids: Regular  Diet Recommendations - Liquids: Thin Liquids    Further Follow-up: Follow up with PCP or referring physician as instructed.              Medication Administration Recommendations: No restrictions       HISTORY   Background/Objective Information: Patient is a 25 y/o female referred for outpatient VFSS d/t c/o frequent globus pharyngeus. Patient reported feeling globus pharyngeus with, and without, PO intake. She also reported occasional odynophagia. UGI completed yesterday without acute process per report.     Problem List  Active Problems:    * No active hospital problems. *      Past Medical History  Past Medical History:    ADHD    Costochondritis    Migraine    OCD (obsessive compulsive disorder)    PTSD (post-traumatic stress disorder)       Current Diet Consistency: Regular;Thin liquids  Prior Level of Function: Independent     History of Recent: No recent respiratory difficulty     Imaging results:   UGI 12/30/24  ONCLUSION:  No acute abnormality is identified.  No gastroesophageal reflux or hiatal hernia was seen.  Patient was in discomfort and described nausea throughout the examination.  The etiology of these symptoms is not forthcoming from this examination.    Consider endoscopy for further assessment.         LOCATION:  [place]         Dictated by (CST): Stromberg, LeRoy, MD on 12/30/2024 at 11:08 AM       Finalized by (CST): Stromberg, LeRoy, MD on 12/30/2024 at 11:12 AM            Family/Patient Goals: none stated     ASSESSMENT   DYSPHAGIA ASSESSMENT  Test completed in conjunction with Radiologist.  Patient Positioned: Upright;Midline.  Patient Viewed: Lateral.   .  Consistencies Presented: Thin liquids;Puree;Hard solid to assess oropharyngeal swallow function and assess for compensatory strategies to improve safe swallow  function.    THIN LIQUIDS  Method of Presentation: Cup  Oral Phase of Swallow (VFSS - Thin Liquids): Within Functional Limits  Triggered at: Base of tongue  Laryngeal Penetration: None  Tracheal Aspiration: None        PUREE  Oral Phase of Swallow (VFSS - Puree): Within Functional Limits  Triggered at: Base of tongue  Laryngeal Penetration: None  Tracheal Aspiration: None     HARD SOLID  Oral Phase of Swallow (VFSS - Hard Solid): Within Functional Limits  Triggered at: Base of tongue  Laryngeal Penetration: None  Tracheal Aspiration: None  Penetration Aspiration Scale Score: Score 1: Material does not enter airway       Overall Impression:   Patient presented with an intact oropharyngeal swallow. Bolus acceptance was adequate without evidence of anterior bolus loss. Mastication and AP bolus transit were thorough and efficient without evidence of oral residue. Pharyngeal swallow initiation was timely. Base of tongue retraction, epiglottic inversion, and hyolaryngeal excursion were WFL. NO penetration or aspiration observed. No significant pharyngeal residue appreciated.    Recommend patient continue a regular diet and thin liquids. No further dysphagia therapy warranted at this time as no deficits identified which require skilled intervention. Education provided re: results and recommendations. All questions answered to apparent satisfaction.              EDUCATION/INSTRUCTION  Reviewed results and recommendations with patient/family/caregiver.  Agreement/Understanding verbalized and all questions answered to their apparent satisfaction.    INTERDISCIPLINARY COMMUNICATION  Reviewed results with Radiologist; agreement verbalized.    Patient Experiencing Pain: No             Thank you for your referral.   If you have any questions, please contact MICHAEL Henderson

## 2025-01-10 ENCOUNTER — PATIENT MESSAGE (OUTPATIENT)
Dept: OTOLARYNGOLOGY | Facility: CLINIC | Age: 25
End: 2025-01-10

## 2025-01-10 PROBLEM — R13.19 ESOPHAGEAL DYSPHAGIA: Status: ACTIVE | Noted: 2025-01-10

## 2025-01-10 PROCEDURE — 88312 SPECIAL STAINS GROUP 1: CPT | Performed by: INTERNAL MEDICINE

## 2025-01-10 PROCEDURE — 88305 TISSUE EXAM BY PATHOLOGIST: CPT | Performed by: INTERNAL MEDICINE

## 2025-01-13 NOTE — TELEPHONE ENCOUNTER
Dr. Marcelino, see Serina's mychart message about a salivary gland biopsy.  Do you need to see her in the office again prior to getting this done?

## 2025-01-14 ENCOUNTER — TELEPHONE (OUTPATIENT)
Facility: CLINIC | Age: 25
End: 2025-01-14

## 2025-01-21 ENCOUNTER — OFFICE VISIT (OUTPATIENT)
Dept: OTOLARYNGOLOGY | Facility: CLINIC | Age: 25
End: 2025-01-21
Payer: COMMERCIAL

## 2025-01-21 DIAGNOSIS — R68.2 DRY MOUTH: Primary | ICD-10-CM

## 2025-01-21 PROCEDURE — 99214 OFFICE O/P EST MOD 30 MIN: CPT | Performed by: OTOLARYNGOLOGY

## 2025-02-03 NOTE — PROGRESS NOTES
PROGRESS NOTE  OTOLOGY/OTOLARYNGOLOGY    REF MD:  No referring provider defined for this encounter.     PCP: Adela Soto    CHIEF COMPLAINT:    Chief Complaint   Patient presents with    Follow - Up     Patient is here for nose pain follow up patient would like to discuss  salivary gland biopsy.      LAST VISIT 11/13/2024  IMPRESSION:  -Discussed with patient that she seems to have easy bruising on the bridge of her nose, which may be related to an underlying condition. Patient overall has easy bruising, possible hypermobility, dry mouth, possible dysautonomia/POTS, joint pain, migraine, costochondritis and intermittent coughing with a small amount of blood (specks).   Rightward nasal septal deviation    PLAN:  -Discussed with patient investigating if genetic testing may be use in better understanding her condition  -Reviewed autoimmune lab results  -Vasculitis labs ordered  -Very thick nasal mucus with dry mouth, dry nasal mucosa. Consider minor salivary gland biopsy.   -Follow-up as needed   ______________________________________    INTERVAL HX: Patient returns to discuss minor salivary gland biopsy.     HISTORY OF PRESENT ILLNESS: Serina Fay is a 24 year old female who presents for evaluation of nasal pain.The patient reports chronic pain in the specified area. She denies any history of nasal fractures or similar injuries. She has been told on multiple occasions that she exhibits bruising in this area and has a history of sleeping face down, sometimes waking up with a cracking sensation. She is under the care of Dr. Rogel, a rheumatologist, for an autoimmune condition that is still under investigation. The patient also has stiffness and arthritis in her spine and AC joint and bruises easily. She experiences episodes of coughing up blood mixed with mucus multiple times a month. Notably, the patient is adopted.      PAST MEDICAL HISTORY:    Past Medical History:    ADHD    Costochondritis    Migraine     OCD (obsessive compulsive disorder)    PTSD (post-traumatic stress disorder)       PAST SURGICAL HISTORY:    Past Surgical History:   Procedure Laterality Date    Other surgical history      wisdom teeth       Medications Ordered Prior to Encounter[1]    Allergies: Allergies[2]    SOCIAL HISTORY:    Social History     Tobacco Use    Smoking status: Never    Smokeless tobacco: Never   Substance Use Topics    Alcohol use: Not Currently       Family History   Adopted: Yes       REVIEW OF SYSTEMS:   Positives are in bold  Neuro: Headache, facial weakness, facial numbness, neck pain, vertigo  ENT: Hearing change, tinnitus, otorrhea, otalgia, aural fullness, ear pressure, vertigo, imbalance  Sinus pressure, rhinorrhea, congestion, facial pain, jaw pain, dysphagia, odynophagia, sore throat, voice changes, shortness of breath    EXAMINATION:  I washed my hands with an alcohol-based hand gel prior to examination  Constitutional:   --Vitals: Last menstrual period 12/15/2024.  General: no apparent distress, well-developed, conversant  Psych: affect pleasant and appropriate for age, alert and oriented  Neuro: Cranial nerves: EOMI, Facial sensation intact to touch, palate elevates midline, tongue protrudes midline, shoulder shrug intact bilateral, facial movement normal bilateral  Respiratory: No stridor, stertor or increased work of breathing  ENT:  --Nose: no external nasal deformity - bruising is noted over the nasal dorsum where her mask is in contact, anterior rhinoscopy: Septum midline, no inferior turbinate hypertrophy, mucosa healthy, no rhinorrhea. Rightward nasal septal deviation.  --OC/OP: No trismus. No masses or lesions noted over the gingiva, Normal but dry appearance to the mucosa, tongue, FOM, hard/soft palate, tonsillar pillars, posterior pharyngeal wall. Tonsils are 1+ and soft. FOM/BOT are soft.   --Neck: No palpable cervical lymphadenopathy, no thyromegaly, no masses or lesions over the bilateral  submandibular or parotid glands  --Ear: (bilateral ears were examined under binocular microscopy)  Right ear microscopic exam:  Pinna: Normal, no lesions or masses.  Mastoid: Nontender on palpation.   External auditory canal: Clear, no masses or lesions.  Tympanic membrane: Intact, no lesions, normal landmarks.  Middle ear: Aerated.    Left ear microscopic exam:  Pinna: Normal, no lesions or masses.  Mastoid: Nontender on palpation.   External auditory canal: Clear, no masses or lesions.  Tympanic membrane: Intact, no lesions, normal landmarks.  Middle ear: Aerated.     Flexible fiberoptic laryngoscopy (date 11/13/24)  Informed consent obtained, patient was correctly identified  Topical anesthesia with aerosolized lidocaine and ephedrine spray in the bilateral nares  Findings: The flexible scope was advanced into the bilateral nares via the inferior meatus into the nasopharynx. No masses or lesions noted in the bilateral inferior meatus. The bilateral eustachian tubes are patent. No masses or lesions in the bilateral nasopharynx or fossae of Rosenmueller. No masses or lesions in the oropharynx, hypopharynx, supraglottis, glottis, subglottis. Normal TVF motion bilaterally in adduction and abduction. Notable interarytenoid edema/erythema. Thick secretions which were suctioned in oral cavity. Normal but dry appearance to the mucosa. Rightward nasal septal deviation.  Complications none, procedure well tolerated.    ASSESSMENT/PLAN:  Serina aFy is a 24 year old female with     ICD-10-CM   1. Dry mouth  R68.2          IMPRESSION:  -Discussed with patient that she seems to have easy bruising on the bridge of her nose, which may be related to an underlying condition. Patient overall has easy bruising, possible hypermobility, dry mouth, possible dysautonomia/POTS, joint pain, migraine, costochondritis and intermittent coughing with a small amount of blood (specks).   Rightward nasal septal  deviation    PLAN:  -Discussed with patient investigating if genetic testing may be use in better understanding her condition  -Reviewed autoimmune lab results  -Vasculitis labs ordered  -Very thick nasal mucus with dry mouth, dry nasal mucosa. Recommend minor salivary gland biopsy.  -Risks of minor salivary gland biopsy include bleeding, infection, non-diagnostic biopsy, lip numbness, lip deformity, scarring  -Follow-up at time of surgery    Situation reviewed with the patient in detail.    Attention: This note has been scribed by Britney Campbell under the supervision of Rosalio Carroll MD.     Rosalio Carroll MD  Otology/Otolaryngology  [facility]   [address] [facility] [address] [place] [id number]  Phone [phone]  Fax [phone]      I have personally performed the services described in this documentation. All medical record entries made by the scribe were at my direction and in my presence. I have reviewed the chart and agree that the medical record reflects my personal performance and is accurate and complete.      Surgery scheduling instructions    Surgery: minor salivary gland biopsy     Duration: 45 minutes    Diagnosis:     ICD-10-CM   1. Dry mouth  R68.2        Special equipment: minor tray    Anesthesia: local    Admission: no    Place of surgery:[facility]    Pre operative work up needed: no           [1]   Current Outpatient Medications on File Prior to Visit   Medication Sig Dispense Refill    cholecalciferol 50 MCG (2000 UT) Oral Cap Take 1 capsule (2,000 Units total) by mouth daily.      Omeprazole 40 MG Oral Capsule Delayed Release Take 1 capsule (40 mg total) by mouth daily. Take 1 capsule by mouth daily before breakfast. 90 capsule 0    hydrOXYzine 25 MG Oral Tab Take 1 tablet (25 mg total) by mouth 2 (two) times daily.      amphetamine-dextroamphetamine 20 MG Oral Tab Take 1 tablet (20 mg total) by mouth.      fluticasone propionate 50 MCG/ACT Nasal Suspension        propranolol 10 MG Oral Tab Take 1 tablet (10 mg total) by mouth 2 (two) times daily.       No current facility-administered medications on file prior to visit.   [2] No Known Allergies

## 2025-02-06 ENCOUNTER — TELEPHONE (OUTPATIENT)
Dept: OTOLARYNGOLOGY | Facility: CLINIC | Age: 25
End: 2025-02-06

## 2025-02-06 DIAGNOSIS — R68.2 DRY MOUTH: Primary | ICD-10-CM

## 2025-02-06 DIAGNOSIS — J34.89 THICK NASAL MUCUS: ICD-10-CM

## 2025-02-06 NOTE — TELEPHONE ENCOUNTER
Patient scheduled for minor salivary gland biopsy on 2/18/25 at [facility].    Neurovascular

## 2025-02-06 NOTE — TELEPHONE ENCOUNTER
Patient has [facility] [employer] HMO IPA coverage. Auth for this coverage will need to come from the PCP at [facility].

## 2025-02-07 ENCOUNTER — LAB ENCOUNTER (OUTPATIENT)
Dept: LAB | Age: 25
End: 2025-02-07
Attending: INTERNAL MEDICINE
Payer: COMMERCIAL

## 2025-02-07 ENCOUNTER — TELEPHONE (OUTPATIENT)
Dept: ORTHOPEDICS CLINIC | Facility: CLINIC | Age: 25
End: 2025-02-07

## 2025-02-07 DIAGNOSIS — R53.82 CHRONIC FATIGUE: ICD-10-CM

## 2025-02-07 DIAGNOSIS — M25.50 POLYARTHRALGIA: ICD-10-CM

## 2025-02-07 DIAGNOSIS — M79.7 FIBROMYALGIA: ICD-10-CM

## 2025-02-07 DIAGNOSIS — M79.642 LEFT HAND PAIN: Primary | ICD-10-CM

## 2025-02-07 LAB
ALBUMIN SERPL-MCNC: 4.9 G/DL (ref 3.2–4.8)
ALBUMIN/GLOB SERPL: 2.2 {RATIO} (ref 1–2)
ALP LIVER SERPL-CCNC: 61 U/L
ALT SERPL-CCNC: 23 U/L
ANION GAP SERPL CALC-SCNC: 11 MMOL/L (ref 0–18)
AST SERPL-CCNC: 33 U/L (ref ?–34)
BASOPHILS # BLD AUTO: 0.05 X10(3) UL (ref 0–0.2)
BASOPHILS NFR BLD AUTO: 0.7 %
BILIRUB SERPL-MCNC: 0.5 MG/DL (ref 0.3–1.2)
BUN BLD-MCNC: 11 MG/DL (ref 9–23)
BUN/CREAT SERPL: 14.3 (ref 10–20)
CALCIUM BLD-MCNC: 9.1 MG/DL (ref 8.7–10.4)
CHLORIDE SERPL-SCNC: 104 MMOL/L (ref 98–112)
CK SERPL-CCNC: 281 U/L
CO2 SERPL-SCNC: 26 MMOL/L (ref 21–32)
CREAT BLD-MCNC: 0.77 MG/DL
CRP SERPL-MCNC: <0.4 MG/DL (ref ?–1)
DEPRECATED RDW RBC AUTO: 40.3 FL (ref 35.1–46.3)
EGFRCR SERPLBLD CKD-EPI 2021: 112 ML/MIN/1.73M2 (ref 60–?)
EOSINOPHIL # BLD AUTO: 0.1 X10(3) UL (ref 0–0.7)
EOSINOPHIL NFR BLD AUTO: 1.3 %
ERYTHROCYTE [DISTWIDTH] IN BLOOD BY AUTOMATED COUNT: 12.7 % (ref 11–15)
ERYTHROCYTE [SEDIMENTATION RATE] IN BLOOD: 2 MM/HR
FASTING STATUS PATIENT QL REPORTED: YES
GLOBULIN PLAS-MCNC: 2.2 G/DL (ref 2–3.5)
GLUCOSE BLD-MCNC: 75 MG/DL (ref 70–99)
HCT VFR BLD AUTO: 36.9 %
HGB BLD-MCNC: 12.7 G/DL
IMM GRANULOCYTES # BLD AUTO: 0.01 X10(3) UL (ref 0–1)
IMM GRANULOCYTES NFR BLD: 0.1 %
LYMPHOCYTES # BLD AUTO: 2.61 X10(3) UL (ref 1–4)
LYMPHOCYTES NFR BLD AUTO: 35.1 %
MCH RBC QN AUTO: 29.9 PG (ref 26–34)
MCHC RBC AUTO-ENTMCNC: 34.4 G/DL (ref 31–37)
MCV RBC AUTO: 86.8 FL
MONOCYTES # BLD AUTO: 0.64 X10(3) UL (ref 0.1–1)
MONOCYTES NFR BLD AUTO: 8.6 %
NEUTROPHILS # BLD AUTO: 4.03 X10 (3) UL (ref 1.5–7.7)
NEUTROPHILS # BLD AUTO: 4.03 X10(3) UL (ref 1.5–7.7)
NEUTROPHILS NFR BLD AUTO: 54.2 %
OSMOLALITY SERPL CALC.SUM OF ELEC: 290 MOSM/KG (ref 275–295)
PLATELET # BLD AUTO: 226 10(3)UL (ref 150–450)
POTASSIUM SERPL-SCNC: 3.5 MMOL/L (ref 3.5–5.1)
PROT SERPL-MCNC: 7.1 G/DL (ref 5.7–8.2)
RBC # BLD AUTO: 4.25 X10(6)UL
RHEUMATOID FACT SERPL-ACNC: <3.5 IU/ML (ref ?–14)
SODIUM SERPL-SCNC: 141 MMOL/L (ref 136–145)
WBC # BLD AUTO: 7.4 X10(3) UL (ref 4–11)

## 2025-02-07 PROCEDURE — 83516 IMMUNOASSAY NONANTIBODY: CPT | Performed by: INTERNAL MEDICINE

## 2025-02-07 PROCEDURE — 80053 COMPREHEN METABOLIC PANEL: CPT | Performed by: INTERNAL MEDICINE

## 2025-02-07 PROCEDURE — 86235 NUCLEAR ANTIGEN ANTIBODY: CPT | Performed by: INTERNAL MEDICINE

## 2025-02-07 PROCEDURE — 85025 COMPLETE CBC W/AUTO DIFF WBC: CPT | Performed by: INTERNAL MEDICINE

## 2025-02-07 PROCEDURE — 82550 ASSAY OF CK (CPK): CPT | Performed by: INTERNAL MEDICINE

## 2025-02-07 PROCEDURE — 86140 C-REACTIVE PROTEIN: CPT | Performed by: INTERNAL MEDICINE

## 2025-02-07 PROCEDURE — 86431 RHEUMATOID FACTOR QUANT: CPT | Performed by: INTERNAL MEDICINE

## 2025-02-07 PROCEDURE — 85652 RBC SED RATE AUTOMATED: CPT | Performed by: INTERNAL MEDICINE

## 2025-02-10 ENCOUNTER — HOSPITAL ENCOUNTER (OUTPATIENT)
Dept: GENERAL RADIOLOGY | Age: 25
Discharge: HOME | End: 2025-02-10
Attending: ORTHOPAEDIC SURGERY
Payer: COMMERCIAL

## 2025-02-10 ENCOUNTER — OFFICE VISIT (OUTPATIENT)
Dept: ORTHOPEDICS CLINIC | Facility: CLINIC | Age: 25
End: 2025-02-10
Payer: COMMERCIAL

## 2025-02-10 VITALS — HEIGHT: 63 IN | WEIGHT: 107 LBS | BODY MASS INDEX: 18.96 KG/M2

## 2025-02-10 DIAGNOSIS — M65.4 TENOSYNOVITIS OF RADIAL STYLOID: Primary | ICD-10-CM

## 2025-02-10 DIAGNOSIS — M79.642 LEFT HAND PAIN: ICD-10-CM

## 2025-02-10 PROCEDURE — 73130 X-RAY EXAM OF HAND: CPT | Performed by: ORTHOPAEDIC SURGERY

## 2025-02-10 PROCEDURE — 3008F BODY MASS INDEX DOCD: CPT | Performed by: ORTHOPAEDIC SURGERY

## 2025-02-10 PROCEDURE — 99204 OFFICE O/P NEW MOD 45 MIN: CPT | Performed by: ORTHOPAEDIC SURGERY

## 2025-02-10 NOTE — H&P
Clinic Note     Assessment/Plan:  24 year old female    Left de Quervain's tenosynovitis/fourth dorsal compartment tendinitis-recommend 4 to 6 weeks of thumb spica wrist immobilization when not at work.  If her symptoms fail to resolve we can do a targeted steroid injection to the first dorsal compartment.  There does seem to be a underlying component of hypermobility joint syndrome which is predisposing the patient in conjunction with her job as a .    Follow Up: 6-8 weeks if her symptoms are not improved significantly    Diagnostic Studies:     XR Left wrist 3 views: No fractures dislocations or osseous abnormalities.  Normal carpal alignment       Physical Exam:     Ht 5' 3\" (1.6 m)   Wt 107 lb (48.5 kg)   LMP 01/16/2025 (Approximate)   BMI 18.95 kg/m²     Constitutional: NAD. AOx3. Well-developed and Well-nourished.   Psychiatric: Normal mood/ affect/ behavior. Judgment and thought content normal.     Left Upper Extremity:     Inspection    Skin intact. No skin lesions. No obvious mass visualized.  No significant swelling around the left wrist   Palpation    Mild tenderness over the first dorsal compartment.  Mild tenderness over the fourth dorsal compartment.      ROM    Full composite fist. and Normal symmetric wrist motion.     Neurovascular    Normal sensation in the median, ulnar, and radial nerve distribution. Normal motor function of muscles innervated by median/AIN, ulnar, and radial/PIN nerves.    Normally perfused hand(s).     Special    Positive Finkelstein's.  Patient does have radiocarpal joint laxity which is symmetric to the contralateral side.          CC: Left wrist/thumb pain    HPI: This 24 year old RHD female presents with pain in the left hand.  She localizes it to the dorsal aspect of her wrist as well as along the radial aspect of the wrist into the thumb.  She reports throbbing, aching, sharp, shooting type pain.  She does have a cramping spasm type sensation in the  normal... thumb and the index finger.  She has occasionally noticed the pink spot appearing over the dorsal aspect of the thumb MCP joint that Dansie resolved.  She does have a flareup every few months but over the last 2 to 3 years it has become more frequent.  No flareups last 2 to 3 days more recently up to 2 weeks.  She did recently change positions at work and she has not had any severe flareups over the past 2 months.    Occupation:     History/Other:   Past Medical History:    ADHD    Costochondritis    Migraine    OCD (obsessive compulsive disorder)    PTSD (post-traumatic stress disorder)     Past Surgical History:   Procedure Laterality Date    Other surgical history      wisdom teeth     Current Outpatient Medications   Medication Sig Dispense Refill    Omeprazole 40 MG Oral Capsule Delayed Release Take 1 capsule (40 mg total) by mouth daily. Take 1 capsule by mouth daily before breakfast. 90 capsule 0    hydrOXYzine 25 MG Oral Tab Take 1 tablet (25 mg total) by mouth 2 (two) times daily.      amphetamine-dextroamphetamine 20 MG Oral Tab Take 1 tablet (20 mg total) by mouth.      fluticasone propionate 50 MCG/ACT Nasal Suspension       propranolol 10 MG Oral Tab Take 1 tablet (10 mg total) by mouth 2 (two) times daily.      cholecalciferol 50 MCG (2000 UT) Oral Cap Take 1 capsule (2,000 Units total) by mouth daily. (Patient not taking: Reported on 2/10/2025)       Allergies[1]  Family History   Adopted: Yes     Social History     Occupational History    Not on file   Tobacco Use    Smoking status: Never    Smokeless tobacco: Never   Vaping Use    Vaping status: Former    Substances: Nicotine   Substance and Sexual Activity    Alcohol use: Not Currently    Drug use: Never    Sexual activity: Not on file          Review of Systems (negative unless bolded):  General: fevers, chills, fatigue  CV:  chest pain, palpitations, leg swelling  Msk: bodyaches, neck pain, neck stiffness  Skin: rashes, open  wounds, nonhealing ulcers  Hem: bleeds easily, bruise easily, immunocompromised  Neuro: dizziness, light headedness, headaches  Psych: anxious, depressed, anger issues      Evy Pina MD   Hand, Wrist, & Elbow Surgery  [email or web address]  t: [phone]  f: [phone]       [1] No Known Allergies

## 2025-02-13 NOTE — PROGRESS NOTES
[facility] - Gastroenterology                                                                                                               Reason for consult: dysphagia    Requesting physician or provider: Adela Soto    Chief Complaint   Patient presents with    Follow - Up       HPI:   Serina Fay is a 24 year old year-old female with active diagnoses including ADHD, OCD, PTSD. Prior medical/surgical history in note table.    she is here today for follow up. Here today with Mom  Today:  #dysphagia  #food regurgitation  -since prior visit symptoms are mostly unchanged, food regurgitation is more frequent   -the omeprazole did help the epigastric pain but did not improve the dysphagia   -s/p normal EGD and normal biopsy on 1/10/25. Dr. Cee advised esophageal manometry  -she is having salivary gland biopsy with ENT on Tuesday     #constipation  #diarrhea  -still fluctuating between diarrhea and constipation. Now mostly diarrhea  -having 3-4 loose bowel movements daily. Sometimes small amount of mucus in stool   -no recent blood during bowel movement     Prior visit 12/26/2024  #odynophagia  #dysphagia  #chest tightness  #GERD  #vomiting  -reports for 1.5 years having symptoms of dysphagia and odynophagia. Reports daily dysphagia with liquids, solids and pills. Described as the proximal esophagus not relaxing to allow passage of solids/liquids. Also has odynophagia more sporadic, occurs maybe once a month.   -reports mostly constant chest tightness described as necessity to belch but unable to do so. Sometimes better with over the counter gas medication.  -frequent food or acid regurgitation and reports feeling like if she bends over her food would all regurgitate up. Frequent nausea which is somewhat better taking cimetidine nightly  -reports making an involuntary sound, can occur on empty stomach but  more frequent after eating. She is unsure if originating from stomach like a belch or from vocal cords  -saw ENT and has laryngoscope and was told larynx is red & swollen. \"Notable interarytenoid edema/erythema \"  -reports she has seen her psychiatrist and neurologist and they don't feel like this related to mental and neurological diagnoses  -she is currently being followed by rheumatology for work up of possible autoimmune condition    #constipation  #BRBPR  -reports chronic constipation, intermittent diarrhea. Eats certain foods such as excess sweet potato or blueberries to help her have bowel movement  -rare blood on tissue after straining to have bowel movement    Patient denies symptoms of nausea, vomiting, globus sensation, hematemesis, abdominal pain, change in bowel habits, or melena. she denies recent change in appetite, fever or unintentional weight loss.      Last colonoscopy: none   Last EGD: 1/10/2025 Dr. Cee   Normal examination of the upper digestive tract     A. Distal esophagus; biopsy:   Fragments of squamous esophageal mucosa showing no significant pathologic abnormalities.   No evidence of significant acute or chronic inflammatory infiltrates, epithelioid granulomas, markedly increased intraepithelial eosinophils (<1 eosinophil per high power field), viral inclusions, intestinal metaplasia, epithelial dysplasia, or malignancy identified.   No evidence of tissue-invasive fungal organisms, parasitic organisms, or  bacterial organisms consistent with Helicobacter species seen on Giemsa stain (with appropriate control reactivity).     B. Proximal esophagus; biopsy:  Fragments of squamous esophageal mucosa showing no significant pathologic abnormalities.   No evidence of significant acute or chronic inflammatory infiltrates, epithelioid granulomas, markedly increased intraepithelial eosinophils (<1 eosinophil per high power field), viral inclusions, intestinal metaplasia, epithelial  dysplasia, or malignancy identified.   No evidence of tissue-invasive fungal organisms, parasitic organisms, or  bacterial organisms consistent with Helicobacter species seen on Giemsa stain (with appropriate control reactivity).    NSAIDS: none   Tobacco: none   Alcohol: none   Marijuana: none   Illicit drugs: none     FH GI malignancy: unknown, adopted   FH IBD:  unknown, adopted     No history of adverse reaction to sedation  No DEBBIE  No anticoagulants/antiplatelet  No pacemaker/defibrillator    Wt Readings from Last 6 Encounters:   02/14/25 108 lb (49 kg)   02/10/25 107 lb (48.5 kg)   02/10/25 107 lb (48.5 kg)   12/27/24 107 lb (48.5 kg)   12/26/24 107 lb (48.5 kg)   11/13/24 105 lb (47.6 kg)        History, Medications, Allergies, ROS:      Past Medical History:    ADHD    Costochondritis    Migraine    OCD (obsessive compulsive disorder)    PTSD (post-traumatic stress disorder)      Past Surgical History:   Procedure Laterality Date    Other surgical history      wisdom teeth      Family Hx:   Family History   Adopted: Yes      Social History:   Social History     Socioeconomic History    Marital status: Single   Tobacco Use    Smoking status: Never    Smokeless tobacco: Never   Vaping Use    Vaping status: Former    Substances: Nicotine   Substance and Sexual Activity    Alcohol use: Not Currently    Drug use: Never     Social Drivers of Health      Received from [facility], [facility]    Housing Stability        Medications (Active prior to today's visit):  Current Outpatient Medications   Medication Sig Dispense Refill    Omeprazole 40 MG Oral Capsule Delayed Release Take 1 capsule (40 mg total) by mouth daily. Take 1 capsule by mouth daily before breakfast. 90 capsule 0    hydrOXYzine 25 MG Oral Tab Take 1 tablet (25 mg total) by mouth 2 (two) times daily.      amphetamine-dextroamphetamine 20 MG Oral Tab Take 1 tablet (20 mg total) by mouth.      fluticasone  propionate 50 MCG/ACT Nasal Suspension       propranolol 10 MG Oral Tab Take 1 tablet (10 mg total) by mouth 2 (two) times daily.      cholecalciferol 50 MCG (2000 UT) Oral Cap Take 1 capsule (2,000 Units total) by mouth daily. (Patient not taking: Reported on 2/14/2025)         Allergies:  Allergies[1]    ROS:   CONSTITUTIONAL: negative for fevers, chills, sweats  EYES Negative for scleral icterus or redness, and diplopia  HEENT: Negative for hoarseness  RESPIRATORY: Negative for cough and severe shortness of breath  CARDIOVASCULAR: Negative for crushing sub-sternal chest pain  GASTROINTESTINAL: See HPI  GENITOURINARY: Negative for dysuria  MUSCULOSKELETAL: Negative for arthralgias and myalgias  SKIN: Negative for jaundice, rash or pruritus  NEUROLOGICAL: Negative for dizziness and headaches  BEHAVIOR/PSYCH: Negative for psychotic behavior    PHYSICAL EXAM:   Blood pressure 128/71, pulse 79, height 5' 3\" (1.6 m), weight 108 lb (49 kg), last menstrual period 01/16/2025.    GEN: Alert, no acute distress, well-nourished   HEENT: anicteric sclera, neck supple, trachea midline, MMM, no palpable or tender neck or supraclavicular lymph nodes  CV: RRR, the extremities are warm and well perfused   LUNGS: No increased work of breathing, CTAB  ABDOMEN: +mildly tender epigastric. Soft, symmetrical, without distention or guarding. No scars or lesions. Aorta is without bruit or visible pulsation. Umbilicus is midline without herniation. Normoactive bowel sounds are present, No masses, hepatomegaly or splenomegaly noted.  MSK: No erythema, no warmth, no swelling of joints  SKIN: No jaundice, no erythema, no rashes, no lesions  HEMATOLOGIC: No bleeding, no bruising  NEURO: Alert and interactive, PRATER  PSYCH: appropriate mood & affect    Labs/Imaging/Procedures:     Patient's pertinent labs and imaging were reviewed and discussed with patient today.        .  ASSESSMENT/PLAN:   Serina Fay is a 24 year old year-old female with  active diagnoses including ADHD, OCD, PTSD. Prior medical/surgical history in note table.    she is here today for follow up. Here today with Mom  Today:  #dysphagia  #food regurgitation  -since prior visit symptoms are mostly unchanged, food regurgitation is more frequent   -the omeprazole did help the epigastric pain but did not improve the dysphagia   -s/p normal EGD and normal biopsy on 1/10/25. Dr. Cee advised esophageal manometry  -she is having salivary gland biopsy with ENT on Tuesday   -reports she has seen her psychiatrist and neurologist and they don't feel like this related to mental and neurological diagnoses  -she is currently being followed by rheumatology for work up of possible autoimmune condition  -no etiology for dysphagia on EGD, esophagram or VFSS. Recommend esophageal specialist with [facility], referral placed. Will continue omeprazole and trial ondansetron (zofran)     #constipation  #diarrhea  -still fluctuating between diarrhea and constipation. Now mostly diarrhea  -having 3-4 loose bowel movements daily. Sometimes small amount of mucus in stool   -no recent blood during bowel movement   -recommend dicyclomine daily prn for diarrhea  -recommend colonoscopy to assess for IBD and assess bleeding etiology      Recommendations:  -can continue omeprazole 40mg     -can try ondansetron (zofran) 4mg for nausea/vomiting    -if esophageal issues or trouble swallowing continue you can follow up with [facility] esophageal specialist. Dr. Cee had recommend esophagus manometry.  Healthcare System: [facility] (this doctor does go to suburb locations)  Who: Dr. Sam Pina, speciality: esophageal gastroenterology  Phone #: [phone]    -can take dicyclomine (bentyl) 10mg. You can take 4x daily before meals and once before bed. I recommend starting once before bed and once in the morning.     -follow up with me after colonoscopy       -------------------------------------------------------------------------------------------------  -Schedule colonoscopy with Dr. Cee  Diagnosis: irregular bowel habits, BRBPR  Sedation: MAC  Prep: split dose suprep    Endoscopy risk/benefit discussion: I have thoroughly discussed the risks, benefits, and alternatives of endoscopic evaluation with the patient, who demonstrated understanding. This includes the potential risks of bleeding, infection, pain, anesthesia complications, and perforation, which may result in prolonged hospitalization or surgical intervention. All of the patient’s questions were addressed to their satisfaction. The patient has chosen to proceed with the endoscopic procedure, including any necessary interventions such as polypectomy, biopsy, control of bleeding.            Orders This Visit:  No orders of the defined types were placed in this encounter.      Meds This Visit:  Requested Prescriptions      No prescriptions requested or ordered in this encounter       Imaging & Referrals:  None      MALINA Chavez    [facility] Gastroenterology  2/14/2025        This note was partially prepared using Dragon Medical voice recognition dictation software. As a result, errors may occur. When identified, these errors have been corrected. While every attempt is made to correct errors during dictation, discrepancies may still exist.          [1] No Known Allergies

## 2025-02-14 ENCOUNTER — TELEPHONE (OUTPATIENT)
Facility: CLINIC | Age: 25
End: 2025-02-14

## 2025-02-14 ENCOUNTER — OFFICE VISIT (OUTPATIENT)
Facility: CLINIC | Age: 25
End: 2025-02-14
Payer: COMMERCIAL

## 2025-02-14 VITALS
DIASTOLIC BLOOD PRESSURE: 71 MMHG | HEIGHT: 63 IN | WEIGHT: 108 LBS | HEART RATE: 79 BPM | BODY MASS INDEX: 19.14 KG/M2 | SYSTOLIC BLOOD PRESSURE: 128 MMHG

## 2025-02-14 DIAGNOSIS — K62.5 BRBPR (BRIGHT RED BLOOD PER RECTUM): Primary | ICD-10-CM

## 2025-02-14 DIAGNOSIS — R11.2 NAUSEA AND VOMITING, UNSPECIFIED VOMITING TYPE: Primary | ICD-10-CM

## 2025-02-14 DIAGNOSIS — K62.5 BRBPR (BRIGHT RED BLOOD PER RECTUM): ICD-10-CM

## 2025-02-14 DIAGNOSIS — R19.8 IRREGULAR BOWEL HABITS: ICD-10-CM

## 2025-02-14 DIAGNOSIS — R13.19 ESOPHAGEAL DYSPHAGIA: ICD-10-CM

## 2025-02-14 PROCEDURE — 3074F SYST BP LT 130 MM HG: CPT

## 2025-02-14 PROCEDURE — 99214 OFFICE O/P EST MOD 30 MIN: CPT

## 2025-02-14 PROCEDURE — 3008F BODY MASS INDEX DOCD: CPT

## 2025-02-14 PROCEDURE — 3078F DIAST BP <80 MM HG: CPT

## 2025-02-14 RX ORDER — ONDANSETRON 4 MG/1
4 TABLET, ORALLY DISINTEGRATING ORAL EVERY 8 HOURS PRN
Qty: 30 TABLET | Refills: 0 | Status: SHIPPED | OUTPATIENT
Start: 2025-02-14

## 2025-02-14 RX ORDER — SODIUM, POTASSIUM,MAG SULFATES 17.5-3.13G
SOLUTION, RECONSTITUTED, ORAL ORAL
Qty: 1 EACH | Refills: 0 | Status: SHIPPED | OUTPATIENT
Start: 2025-03-01

## 2025-02-14 RX ORDER — DICYCLOMINE HYDROCHLORIDE 10 MG/1
10 CAPSULE ORAL
Qty: 120 CAPSULE | Refills: 2 | Status: SHIPPED | OUTPATIENT
Start: 2025-02-14 | End: 2025-05-15

## 2025-02-14 NOTE — TELEPHONE ENCOUNTER
Patient was seen in office today with MALINA Chavez and was given orders to schedule. Please call patient to schedule his/her procedure with the orders given below. Patient was given the phone number and prep instructions at the time of the office visit. The prep instructions was also explained to the patient at the time of the visit. The patient verbalized understanding the prep and that a GI  will call him/her for the procedure.  If patient calls before the 1 week turnaround please schedule with the orders given below, thank you!    Orders from MALINA Chavez      1. Schedule colonoscopy with Dr. Cee  Diagnosis: irregular bowel habits, BRBPR  Sedation: MAC  Prep: split dose suprep     2.  bowel prep from pharmacy   You can pick the bowel prep up now and store in a cool, dry place in your home until your scheduled bowel prep start date.     3. Continue all medications as normal for your procedure. DO NOT TAKE: Any form of alcohol, recreational drugs and any forms of erectile dysfunction medications 24 hours prior to procedure.     4. Read all bowel prep instructions carefully. Bowel prep instructions can also be found online at:  [email or web address]      5. AVOID seeds, nuts, popcorn, raw fruits and vegetables for 5 days before procedure     6. If you start any NEW medication after your visit today, please notify us. Certain medications (like iron or weight loss medications) will need to be held before the procedure, or the procedure cannot be performed safely.

## 2025-02-14 NOTE — PATIENT INSTRUCTIONS
-can continue omeprazole 40mg     -can try ondansetron (zofran) 4mg for nausea/vomiting    -if esophageal issues or trouble swallowing continue you can follow up with [facility] esophageal specialist. Dr. Cee had recommend esophagus manometry.  Healthcare System: [facility] ([facility]  Who: Dr. Sam Pina, speciality: esophageal gastroenterology  Phone #: [phone]    -can take dicyclomine (bentyl) 10mg. You can take 4x daily before meals and once before bed. I recommend starting once before bed and once in the morning.     -follow up with me after colonoscopy      -------------------------------------------------------------------------------------------------  1. Schedule colonoscopy with Dr. Cee  Diagnosis: irregular bowel habits, BRBPR  Sedation: MAC  Prep: split dose suprep    2.  bowel prep from pharmacy   You can pick the bowel prep up now and store in a cool, dry place in your home until your scheduled bowel prep start date.    3. Continue all medications as normal for your procedure. DO NOT TAKE: Any form of alcohol, recreational drugs and any forms of erectile dysfunction medications 24 hours prior to procedure.    4. Read all bowel prep instructions carefully. Bowel prep instructions can also be found online at:  [email or web address]/giprep     5. AVOID seeds, nuts, popcorn, raw fruits and vegetables for 5 days before procedure    6. If you start any NEW medication after your visit today, please notify us. Certain medications (like iron or weight loss medications) will need to be held before the procedure, or the procedure cannot be performed safely.

## 2025-02-14 NOTE — TELEPHONE ENCOUNTER
Scheduled for:  Colonoscopy 16317  Provider Name:  Dr. Cee  Date:  6/20/2025  Location:  [facility]  Sedation:  MAC  Time:  1:15pm, pt is awar [facility] will call with arrival time  Prep:  Suprep  Meds/Allergies Reconciled?:  Physician reviewed     Diagnosis with codes:  irregular bowel habits R19.8, BRBPR K62.5   Was patient informed to call insurance with codes (Y/N):  Yes     Referral sent?:  Referral was sent at the time of electronic surgical scheduling.   [facility] or [facility] notified?:  I sent an electronic request to Endo Scheduling and received a confirmation today.      Medication Orders:  n/a  Misc Orders:  n/a     Further instructions given by staff:   Patient states received instructions in office

## 2025-02-18 PROBLEM — R68.2 DRY MOUTH: Status: ACTIVE | Noted: 2025-02-18

## 2025-02-18 PROCEDURE — 88305 TISSUE EXAM BY PATHOLOGIST: CPT | Performed by: OTOLARYNGOLOGY

## 2025-02-19 ENCOUNTER — TELEPHONE (OUTPATIENT)
Dept: OTOLARYNGOLOGY | Facility: CLINIC | Age: 25
End: 2025-02-19

## 2025-02-19 NOTE — TELEPHONE ENCOUNTER
Left message for call back - pt is post op day 1 minor salivary gland biopsy     Follow-up appointment 1-2 weeks after surgery with Dr. Marcelino.   Sutures in your mouth will dissolve.   Lip swelling is expected. It is ok to ice the area to help the swelling go down. The lip can feel temporarily numb.  Ok to use over the counter medication such as ibuprofen or acetaminophen for pain control.   resume a normal diet, but if lip is swollen be careful to not bite lip.   It is ok to brush  teeth and use non-alcohol mouthwash.   It is ok to resume normal activities the day after surgery.

## 2025-03-03 ENCOUNTER — OFFICE VISIT (OUTPATIENT)
Dept: OTOLARYNGOLOGY | Facility: CLINIC | Age: 25
End: 2025-03-03
Payer: COMMERCIAL

## 2025-03-03 DIAGNOSIS — R68.2 DRY MOUTH: Primary | ICD-10-CM

## 2025-03-03 PROCEDURE — 99024 POSTOP FOLLOW-UP VISIT: CPT | Performed by: OTOLARYNGOLOGY

## 2025-03-03 NOTE — PROGRESS NOTES
PROGRESS NOTE  OTOLOGY/OTOLARYNGOLOGY    REF MD:  No referring provider defined for this encounter.     PCP: Adela Soto    CHIEF COMPLAINT:    No chief complaint on file.    LAST VISIT 1/21/2025  IMPRESSION:  -Discussed with patient that she seems to have easy bruising on the bridge of her nose, which may be related to an underlying condition. Patient overall has easy bruising, possible hypermobility, dry mouth, possible dysautonomia/POTS, joint pain, migraine, costochondritis and intermittent coughing with a small amount of blood (specks).   Rightward nasal septal deviation    PLAN:  -Discussed with patient investigating if genetic testing may be use in better understanding her condition  -Reviewed autoimmune lab results  -Vasculitis labs ordered  -Very thick nasal mucus with dry mouth, dry nasal mucosa. Recommend minor salivary gland biopsy.  -Risks of minor salivary gland biopsy include bleeding, infection, non-diagnostic biopsy, lip numbness, lip deformity, scarring  -Follow-up at time of surgery  ___________________________________________________________________    INTERVAL HX: S/P Minor salivary gland biopsy  - 2/18/2025. No lip numbness. The scar is noted to feel a bit tight.     HISTORY OF PRESENT ILLNESS: Serina Fay is a 24 year old female who presents for evaluation of nasal pain.The patient reports chronic pain in the specified area. She denies any history of nasal fractures or similar injuries. She has been told on multiple occasions that she exhibits bruising in this area and has a history of sleeping face down, sometimes waking up with a cracking sensation. She is under the care of Dr. Rogel, a rheumatologist, for an autoimmune condition that is still under investigation. The patient also has stiffness and arthritis in her spine and AC joint and bruises easily. She experiences episodes of coughing up blood mixed with mucus multiple times a month. Notably, the patient is adopted.      PAST  MEDICAL HISTORY:    Past Medical History:    ADHD    Costochondritis    Migraine    OCD (obsessive compulsive disorder)    PTSD (post-traumatic stress disorder)       PAST SURGICAL HISTORY:    Past Surgical History:   Procedure Laterality Date    Other surgical history      wisdom teeth       Medications Ordered Prior to Encounter[1]    Allergies: Allergies[2]    SOCIAL HISTORY:    Social History     Tobacco Use    Smoking status: Never    Smokeless tobacco: Never   Substance Use Topics    Alcohol use: Not Currently       Family History   Adopted: Yes       REVIEW OF SYSTEMS:   Per HPI    EXAMINATION:  I washed my hands with an alcohol-based hand gel prior to examination  Constitutional:   --Vitals: Last menstrual period 01/16/2025.  General: no apparent distress, well-developed, conversant  Psych: affect pleasant and appropriate for age, alert and oriented  Neuro: Cranial nerves: EOMI, Facial sensation intact to touch, palate elevates midline, tongue protrudes midline, shoulder shrug intact bilateral, facial movement normal bilateral  Respiratory: No stridor, stertor or increased work of breathing  ENT:  --Nose: no external nasal deformity - bruising is noted over the nasal dorsum where her mask is in contact, anterior rhinoscopy: Septum midline, no inferior turbinate hypertrophy, mucosa healthy, no rhinorrhea. Rightward nasal septal deviation.  --OC/OP: No trismus. No masses or lesions noted over the gingiva, Normal but dry appearance to the mucosa, tongue, FOM, hard/soft palate, tonsillar pillars, posterior pharyngeal wall. Tonsils are 1+ and soft. FOM/BOT are soft. Incision in the mucosa of the lower lip to the left of midline is well healed.   --Neck: No palpable cervical lymphadenopathy, no thyromegaly, no masses or lesions over the bilateral submandibular or parotid glands  --Ear: (bilateral ears were examined under binocular microscopy)  Right ear microscopic exam:  Pinna: Normal, no lesions or  masses.  Mastoid: Nontender on palpation.   External auditory canal: Clear, no masses or lesions.  Tympanic membrane: Intact, no lesions, normal landmarks.  Middle ear: Aerated.    Left ear microscopic exam:  Pinna: Normal, no lesions or masses.  Mastoid: Nontender on palpation.   External auditory canal: Clear, no masses or lesions.  Tympanic membrane: Intact, no lesions, normal landmarks.  Middle ear: Aerated.     Flexible fiberoptic laryngoscopy (date 11/13/24)  Informed consent obtained, patient was correctly identified  Topical anesthesia with aerosolized lidocaine and ephedrine spray in the bilateral nares  Findings: The flexible scope was advanced into the bilateral nares via the inferior meatus into the nasopharynx. No masses or lesions noted in the bilateral inferior meatus. The bilateral eustachian tubes are patent. No masses or lesions in the bilateral nasopharynx or fossae of Rosenmueller. No masses or lesions in the oropharynx, hypopharynx, supraglottis, glottis, subglottis. Normal TVF motion bilaterally in adduction and abduction. Notable interarytenoid edema/erythema. Thick secretions which were suctioned in oral cavity. Normal but dry appearance to the mucosa. Rightward nasal septal deviation.  Complications none, procedure well tolerated.    Pathology 2/18/2018  Final Diagnosis:      Lower lip minor salivary gland, biopsy:   Fragments of benign salivary gland tissue with minimal plasma cells and no lymphocytic infiltrate  No evidence of Sjogren syndrome identified       ASSESSMENT/PLAN:  Serina Fay is a 24 year old female with   No diagnosis found.    IMPRESSION:  -Discussed with patient that she seems to have easy bruising on the bridge of her nose, which may be related to an underlying condition. Patient overall has easy bruising, possible hypermobility, dry mouth, possible dysautonomia/POTS, joint pain, migraine, costochondritis and intermittent coughing with a small amount of blood  (specks).   Rightward nasal septal deviation  S/P Minor salivary gland biopsy  - 2/18/2025. No evidence of seronegative Sjogren's    PLAN:  -Pathology results reviewed  -Recommend massage of the incision site to avoid contracture  -Follow-up as needed     Situation reviewed with the patient in detail.    Attention: This note has been scribed by Britney Campbell under the supervision of Rosalio Carroll MD.     Rosalio Carroll MD  Otology/Otolaryngology  [address] [place] [id number]  Phone [phone]  Fax [phone]      I have personally performed the services described in this documentation. All medical record entries made by the scribe were at my direction and in my presence. I have reviewed the chart and agree that the medical record reflects my personal performance and is accurate and complete.           [1]   Current Outpatient Medications on File Prior to Visit   Medication Sig Dispense Refill    ondansetron 4 MG Oral Tablet Dispersible Take 1 tablet (4 mg total) by mouth every 8 (eight) hours as needed for Nausea. 30 tablet 0    dicyclomine 10 MG Oral Cap Take 1 capsule (10 mg total) by mouth 4 (four) times daily before meals and nightly. Take up to 4x daily for abdominal cramping/spasms 120 capsule 2    Omeprazole 40 MG Oral Capsule Delayed Release Take 1 capsule (40 mg total) by mouth daily. Take 1 capsule by mouth daily before breakfast. 90 capsule 0    hydrOXYzine 25 MG Oral Tab Take 1 tablet (25 mg total) by mouth 2 (two) times daily.      amphetamine-dextroamphetamine 20 MG Oral Tab Take 1 tablet (20 mg total) by mouth.      fluticasone propionate 50 MCG/ACT Nasal Suspension       propranolol 10 MG Oral Tab Take 1 tablet (10 mg total) by mouth 2 (two) times daily.       No current facility-administered medications on file prior to visit.   [2] No Known Allergies

## 2025-03-05 LAB
ANTI-EJ: NEGATIVE
ANTI-JO-1: <20 UNITS
ANTI-KU: NEGATIVE
ANTI-MDA-5: <20 UNITS
ANTI-MI-2 AB: NEGATIVE
ANTI-NXP-2: <20 UNITS
ANTI-OJ: NEGATIVE
ANTI-PL-12: NEGATIVE
ANTI-PL-7: NEGATIVE
ANTI-PM/SCL100: <20 UNITS
ANTI-SAE1: <20 UNITS
ANTI-SRP AB: NEGATIVE
ANTI-SSA 52KD IGG: <20 UNITS
ANTI-TIF-1GAMMA: <20 UNITS
ANTI-U1 RNP: <20 UNITS
ANTI-U2 RNP: NEGATIVE
ANTI-U3 RNP: NEGATIVE

## 2025-03-11 ENCOUNTER — OFFICE VISIT (OUTPATIENT)
Dept: RHEUMATOLOGY | Facility: CLINIC | Age: 25
End: 2025-03-11
Payer: COMMERCIAL

## 2025-03-11 VITALS
HEIGHT: 63 IN | BODY MASS INDEX: 19.31 KG/M2 | SYSTOLIC BLOOD PRESSURE: 106 MMHG | HEART RATE: 78 BPM | RESPIRATION RATE: 16 BRPM | OXYGEN SATURATION: 100 % | WEIGHT: 109 LBS | DIASTOLIC BLOOD PRESSURE: 60 MMHG | TEMPERATURE: 98 F

## 2025-03-11 DIAGNOSIS — R74.8 ELEVATED CK: ICD-10-CM

## 2025-03-11 DIAGNOSIS — M25.50 POLYARTHRALGIA: ICD-10-CM

## 2025-03-11 DIAGNOSIS — M79.7 FIBROMYALGIA: Primary | ICD-10-CM

## 2025-03-11 DIAGNOSIS — R53.82 CHRONIC FATIGUE: ICD-10-CM

## 2025-03-11 PROCEDURE — 3078F DIAST BP <80 MM HG: CPT | Performed by: INTERNAL MEDICINE

## 2025-03-11 PROCEDURE — 3008F BODY MASS INDEX DOCD: CPT | Performed by: INTERNAL MEDICINE

## 2025-03-11 PROCEDURE — 3074F SYST BP LT 130 MM HG: CPT | Performed by: INTERNAL MEDICINE

## 2025-03-11 PROCEDURE — 99215 OFFICE O/P EST HI 40 MIN: CPT | Performed by: INTERNAL MEDICINE

## 2025-03-11 NOTE — PROGRESS NOTES
?  RHEUMATOLOGY FOLLOW UP   Date of visit: 03/11/2025  ?  Chief Complaint   Patient presents with    Fibromyalgia Syndrome     7 month f/u. Still feeling bad. Symptoms are worse, but tolerating them better. All over pain. Joint pain. Skin sensitivity. Slight pain with breathing. Issues with throat is worse. Still not sleeping well. Converted rapid score of 4.7       ?  ASSESSMENT, DISCUSSION & PLAN   Assessment:  1. Fibromyalgia    2. Polyarthralgia    3. Chronic fatigue    4. Elevated CK        Discussion:  Ms. Serina Fay is a 23 yo woman with long standing history of pain and fatigue. Seems like she has has negative work up in the past but does have areas of arthritis out of proportion for her age. She does have clear signs of fibromyalgia but discussed that this may not explain all of her symptoms.   Her serology showed an SERG that was positive but with a pattern that is seen in false positivity/normal population.  Remainder of JUSTINO panel was negative.  The remainder of her labs were also normal including inflammatory markers, complements, rheumatoid studies as well as inflammatory markers.  Reminded patient again that she likely suffers from fibromyalgia compounded by depression and anxiety which is likely contributing to at least some of her symptoms.  Does not account for the redness over her body in various points.  Question of Raynaud's versus erythromelalgia versus other etiology.  I strongly recommended that she consider treatment for the underlying mood disorder which may help with her chronic pain as well as fatigue.  She also has a horrible sleep schedule due to social stressors and managing work with other things.   She has since est care with GI and neurology. Following with ortho as well.  She still lacks signs of synovitis/tenosynovitis to suggest active joint inflammation.   She had a CK elevated but mild, will repeat. Myositis ab panel was negative.   Pt queried EDS- she did not meet criteria  based off Beighton scoring but she may have an hypermobility syndrome.   Will send mychart with options of PT/chiro in the area that can help with her fibro.   Again recommended she consider low dose amitriptyline vs duloxetine vs low dose naltrexone.   Recommended she consider seeing cardiology vs Dr. Mann to aid further with likely POTS.    Otherwise, okay to follow up in 6-9 months or sooner as needed.     Patient verbalized understanding of above instructions. No further questions at this time.    Code selection for this visit was based on time spent (42min) on date of service in preparing to see the patient, obtaining and/or reviewing separately obtained history, performing a medically appropriate examination, counseling and educating the patient/family/caregiver, ordering medications or testing, referring and communicating with other healthcare providers, documenting clinical information in the E HR, independently interpreting results and communicating results to the patient/family/caregiver and care coordination with the patient's other providers.    ?  Plan:  Diagnoses and all orders for this visit:    Fibromyalgia  -     CK (Creatine Kinase) (Not Creatinine); Future    Polyarthralgia    Chronic fatigue  -     CK (Creatine Kinase) (Not Creatinine); Future    Elevated CK  -     CK (Creatine Kinase) (Not Creatinine); Future            Return in about 8 months (around 11/11/2025).  ?  HPI   Serina Fay is a 24 year old female with the following active problems who is referred for rheumatologic evaluation due to pain. She presents for follow up today.     Since her last visit, she has a lot going on.   Her mother had health issues and she withdrew from school. Her mother is only now starting to become independent.  Is working one job ([employer]) right now- only needed bracing once without severe pain like before. For the wrist, seen by ortho and recommend injection if it happens again. She feels like it takes  longer to recover from but hasn't happened as severely as before. Still gets intermittent redness. Using different/better brace.   Feels fragile overall.     In terms of throat issues- seen by ENT- told it looks \"fragile\" per pt. Had EGD done in Jan. Biopsies were negative and told if symptoms persist, can consider manometry. Recommended to get through [facility]. Told could be related to hypermobility. Given multiple meds. Zofran helps but only temporarily. Typically episodes happen with food/water, almost like a yell at this point. The dicyclomine and omeprazole has not helped.   Has plans for colonoscopy in June.    Seen by ENT and had biopsy which was negative for sjogren's.     + new glasses, eye exam normal. But feels like her eyesight is going in/out. Does not feel related to her adderall. Concerned if happening when driving. Brought up two years ago but feels more frequent.     + ribs feel like getting caught- since at least June of last year. Is painful and feels like her organs are getting caught. Denies cramping. Cannot tell me if tender to the touch. Can take up to 24h but typically around 4 hour duration. Tries to stretch, massage and drink water without much relief.     Running consistently or brisk walk and staying active outside of work.   Has increased stretching  Still feeling stiffness regularly     Seen by podiatry and went through PT- now can feel toe again but still feels hip not sitting properly which then affects the knee and ankle.     Is trying to focus on eating healthy and sleeping better  But still having pain at night.   States did sleep \"3 days in a row\" due to the fatigue     Still with frequent urination. Feels incomplete bladder emptying.   + persistent diarrhea. Trying to eat fiber and protein without affect on stools    Has not seen neurology since her last visit, but never formally dx with autonomic dysfunction or POTS.  Made appt with Dr. Mann but in 2026.   Felt lower BP when  off adderall. Also feels she has heart rate variability with HR in 40s-180s throughout the day.     HPI from initial consultation  referred for rheumatologic evaluation due to pain.     Was hospitalized when sophomore in high school due to severity pain. Was given morphine and felt like pain had gone away and when out of system, pain returned.    States starting in 4th grade, she had sudden onset of pain in the right knee. Told at that time, was tendonitis. But had difficulty walking due to the pain. Would be crawling around her house. Did PT and had some improvement of ability to walk but no improvement of the pain    Then developed bilateral hip pain, left worse than the right  Both knees hurt and can get right knee swelling.     Did previously play water polo, gymnastics and drums in marching band for years.   States symptoms exacerbated with diet, exercise and mental health changes.    Left arm and left side of back nerve pain  + migraines (started about 2 years ago), can get numbness/tingling on the entire left side, even her tongue   + blurred vision and difficulty with eyes focusing- eye exams have been normal. MRI of brain normal except in the past scattered cortical intensities  + prior EEG showing cerebral dysfunction- after covid infection  + left shoulder/back neck pain- had MRI done and told by spine surgeon that pain not related to findings.   + sensitivity over the nose with light touch. Possible slight curvature of the nose but other work up negative. Some bruising over the bridge and possible swelling.   + left wrist pain and weakness     Has been dx with chronic fatigue and polyarthralgia.   Hx of covid infection in 2022 - getting diaphragm spasms, trouble swallowing and sensation of throat being tight, some pain with swallowing.     Works for [employer] and is on light duty (<10#). Tries to avoid repetitive motions.     + significant fatigue despite how much she sleeps or drinks caffeine. Feels like she  tires easily. Never wakes up feeling rest.   + gets injuries easily. Denies increased flexibility and feels the opposite.   + gets a throbbing in her legs primarily but felt all over.     Often cracks her shoulder/neck for relief  Also has TMJ but has not been able to afford the treatment . Hx of bruxism     + can get pinky and thumb swelling  + ankle with hx of rolling/trauma  Hx of trauma to the right foot.  Hx of costochondritis    Previously given gabapentin and helped. But concerned with taking long term and side effects. ( Was on 600mg TID, did not feel an improvement with the 300mg dosage).  Also previously given meloxicam, not currently taking. Not sure if made a difference.  Feels naproxen helped better when taking (took BID).   Would take a lot of ibuprofen and tylenol when younger due to the pain (took about 4-8/day)     Hx of depression, anxiety as well as C-PTSD. Was adopted around 4y. Was hospitalized after birth due to birth mother drug abuse.  encephalopathy from what she can gather from records.     + hair loss/thinning  + oral canker sores- improved, thinks related to food   + intermittent discoloration of the toes/feet  + gets cold easily   + constipation, never had formal GI workup   + hives over neck   + brittle nails   + dry eyes per pt (attributes to adderall), uses contact drops and allergy eye drops sometimes   + dry mouth  (attributes to adderall), uses mints   + gets a gagging response randomly- denies association with certain foods or anxiety   + gets sick easily   + LOC due to severity of pain     The patient denies nasal ulcers, photosensitive rash, elevated or scarring rashes, prior hematologic abnormality, prior renal or liver disease, or history of seizures.  Denies hx of pericarditis or pleuritis. ? Right axis deviation and \"extra chord\" per pt   No history of prior blood clot in the legs or lungs, strokes or ischemic phenomenon.  Denies nonhealing ulcers on the  fingertips.  Denies heart burn.  The patient denies any history of uveitis, crampy abdominal pain, bloating, diarrhea, bloody stools, nodular painful shin bruises, Achilles heel pain or symptoms of enthesitis, psoriatic lesions, spooning or pitting of the nails.      No fevers, chills, lymphadenopathy, night sweats, unexpected weight loss, easy bruising or bleeding, or unexplained weakness.      Family hx unknown since adopted     Past Medical History:  Past Medical History:    ADHD    Costochondritis    Migraine    OCD (obsessive compulsive disorder)    PTSD (post-traumatic stress disorder)     Past Surgical History:  Past Surgical History:   Procedure Laterality Date    Other surgical history      wisdom teeth     Family History:  Family History   Adopted: Yes     Social History:  Social History     Socioeconomic History    Marital status: Single   Tobacco Use    Smoking status: Never    Smokeless tobacco: Never   Vaping Use    Vaping status: Former    Substances: Nicotine   Substance and Sexual Activity    Alcohol use: Not Currently    Drug use: Never     Social Drivers of Health      [facility]    Housing Stability     Medications:  Outpatient Medications Marked as Taking for the 3/11/25 encounter (Office Visit) with Anna Marie Elizabeth DO   Medication Sig Dispense Refill    ondansetron 4 MG Oral Tablet Dispersible Take 1 tablet (4 mg total) by mouth every 8 (eight) hours as needed for Nausea. 30 tablet 0    dicyclomine 10 MG Oral Cap Take 1 capsule (10 mg total) by mouth 4 (four) times daily before meals and nightly. Take up to 4x daily for abdominal cramping/spasms 120 capsule 2    Omeprazole 40 MG Oral Capsule Delayed Release Take 1 capsule (40 mg total) by mouth daily. Take 1 capsule by mouth daily before breakfast. 90 capsule 0    hydrOXYzine 25 MG Oral Tab Take 1 tablet (25 mg total) by mouth 2 (two) times daily.       amphetamine-dextroamphetamine 20 MG Oral Tab Take 1 tablet (20 mg total) by mouth.      fluticasone propionate 50 MCG/ACT Nasal Suspension       propranolol 10 MG Oral Tab Take 1 tablet (10 mg total) by mouth 2 (two) times daily.       Modified Medications    No medications on file     There are no discontinued medications.    ?  ?  Allergies:  No Known Allergies  ?  REVIEW OF SYSTEMS   ?  Review of Systems   Constitutional:  Positive for chills and malaise/fatigue. Negative for fever and weight loss.   HENT:  Positive for congestion. Negative for nosebleeds.    Eyes:  Positive for blurred vision and pain. Negative for redness.   Respiratory:  Positive for shortness of breath. Negative for cough and hemoptysis.    Cardiovascular:  Positive for chest pain and palpitations. Negative for leg swelling.   Gastrointestinal:  Positive for constipation. Negative for abdominal pain, blood in stool, diarrhea, heartburn and nausea.   Genitourinary:  Positive for frequency. Negative for dysuria, hematuria and urgency.   Musculoskeletal:  Positive for back pain, joint pain, myalgias and neck pain.   Skin:  Negative for itching and rash.   Neurological:  Positive for dizziness, tingling, loss of consciousness, weakness and headaches. Negative for seizures.   Endo/Heme/Allergies:  Positive for environmental allergies. Does not bruise/bleed easily.   Psychiatric/Behavioral:  Positive for depression. The patient is nervous/anxious.      PHYSICAL EXAM   Today's Vitals:  Temperature Blood Pressure Heart Rate Resp Rate SpO2   Temp: 97.9 °F (36.6 °C) BP: 106/60 Pulse: 78 Resp: 16 SpO2: 100 %   ?  Current Weight Height BMI BSA Pain   Wt Readings from Last 1 Encounters:   03/11/25 109 lb (49.4 kg)    Height: 5' 3\" (160 cm) Body mass index is 19.31 kg/m². Body surface area is 1.49 meters squared.         Physical Exam  Vitals and nursing note reviewed.   Constitutional:       General: She is not in acute distress.     Appearance: Normal  appearance. She is well-developed. She is not diaphoretic.   HENT:      Head: Normocephalic.   Eyes:      General: No scleral icterus.     Conjunctiva/sclera: Conjunctivae normal.   Neck:      Vascular: No JVD.      Trachea: No tracheal deviation.   Cardiovascular:      Rate and Rhythm: Regular rhythm. Tachycardia present.      Heart sounds: No murmur heard.  Pulmonary:      Effort: Pulmonary effort is normal. No respiratory distress.      Breath sounds: Normal breath sounds.   Musculoskeletal:         General: Tenderness present. No swelling.      Comments: No evidence of heberden or ann nodes of any of the fingers, no basilar joint tenderness of the 1st CMC bilaterally.  Tenderness diffusely  No swelling, redness or restriction of motion of the DIPs, PIPs, MCPs, wrists, elbows, ankles, or joints of the feet.  Bilateral shoulders with full ROM, no evidence of impingement with provocative maneuvers.  Bilateral knees without medial joint line tenderness, no crepitus, no effusion.  Pan positive fibro tender points  Significant poor posture    Beighton scoring for hypermobility  0- One point if while standing forward bending you can place palms on the ground with legs straight  2- One point for each elbow that bends backwards  0- One point for each knee that bends backwards  0- One point for each thumb that touches the forearm when bent backwards  0- One point for each little finger that bends backwards beyond 90 degrees.   Skin:     General: Skin is warm and dry.      Findings: No erythema or rash.      Comments: No malar rash  No periungal erythema  No fingernail pitting   Neurological:      Mental Status: She is alert and oriented to person, place, and time.      Cranial Nerves: No cranial nerve deficit.      Gait: Gait normal.       ?  Radiology review:      Martín Koch MD - 02/28/2023  Formatting of this note might be different from the original.  EXAM:  MRI CERVICAL SPINE WITHOUT IV CONTRAST,  2/28/2023 8:32 am    HISTORY:  Cervical radiculopathy.    COMPARISON:  None    TECHNIQUE:  Non-contrast multiplanar multi-sequence MRI of the cervical spine.    FINDINGS:  The craniovertebral junction and C1-C2 articulation show preserved alignment. Spinal alignment is maintained. Vertebral body heights are normal. No suspicious marrow lesions. No restricted diffusion. Spinal cord is unremarkable. No high-grade narrowing of the spinal canal or neural foramina at any level.    Early disc degeneration at C5-C6 with minimal disc height loss and disc bulge. Minimal spinal canal stenosis at this level. Perivertebral soft tissues are within normal limits. Flow voids are preserved.    IMPRESSION:    Early disc degeneration at C5-C6 with minimal disc bulge at this level. No high-grade narrowing of the spinal canal or neural foramina at any level. The spinal cord is unremarkable.      Attending: Martín Koch on 02/28/2023 8:39 AM  Exam End: 02/28/23  8:32 AM    Specimen Collected: 02/28/23  8:32 AM      EXAM:   MRI SHOULDER LEFT WITHOUT IV CONTRAST, 2/28/2023 8:32 am     HISTORY:   Cervical radiculopathy  Radiculopathy, cervical region     COMPARISON:   Cervical radiculopathy.     TECHNIQUE:   Multi-planar multi-sequence images were obtained of the left shoulder without contrast.     FINDINGS:   No evidence of os acromiale or Hill-Sachs lesion.     Supraspinatus, infraspinatus, teres minor, and subscapularis tendons are intact.     Biceps tendon is located within the bicipital groove and appears intact.     Small osteophytes are present in the AC joint. No bursitis is seen.     No loss of muscle bulk or fatty atrophy of the rotator cuff musculature.     Labrum is not optimally evaluated on this non-arthrographic study; however, no gross labral abnormalities are seen.     No significant joint effusion.     Further evaluation of degenerative changes are better evaluated on same-day MRI cervical spine.     Examination: MRI of the  right knee without IV contrast   Clinical indication: Right knee pain   Comparison: None   Technique: MRI of the right knee was performed with axial T2 fat saturated   sequence, sagittal T1, proton density, and T2 fat-saturated sequences, and   coronal T2 fat-saturated and proton density weighted sequences.   Findings: Patellofemoral articular cartilage is mildly heterogenous at the   patellar apex, consistent with mild chondromalacia. The extensor mechanism of   the knee is intact. There is a small joint effusion. Mild edema is noted in the   anterior quadriceps fat pad. There is mild edema surrounding the infrapatellar   plica. A small amount of fluid is noted along the deep aspect of the patellar   tendon. No Blum's cyst is present.   The biceps femoris tendon, lateral ligamentous complex, popliteus tendon,   iliotibial band, ACL, PCL, and MCL are intact.   The medial meniscus is intact. Medial compartment articular cartilage is   unremarkable.   The lateral meniscus is intact. Lateral compartment articular cartilage is   unremarkable.   Impression:   1. Findings compatible with mild anterior quadriceps fat pad impingement and   mild infrapatellar plica sprain.   2. Mild chondromalacia patella.   3. Small joint effusion.   Dictating [place]   Dictated 01/20/2020 07:03     Labs:  Lab Results   Component Value Date    WBC 7.4 02/07/2025    RBC 4.25 02/07/2025    HGB 12.7 02/07/2025    HCT 36.9 02/07/2025    .0 02/07/2025    MCV 86.8 02/07/2025    MCH 29.9 02/07/2025    MCHC 34.4 02/07/2025    RDW 12.7 02/07/2025    NEPRELIM 4.03 02/07/2025    NEPERCENT 54.2 02/07/2025    LYPERCENT 35.1 02/07/2025    MOPERCENT 8.6 02/07/2025    EOPERCENT 1.3 02/07/2025    BAPERCENT 0.7 02/07/2025    NE 4.03 02/07/2025    LYMABS 2.61 02/07/2025    MOABSO 0.64 02/07/2025    EOABSO 0.10 02/07/2025    BAABSO 0.05 02/07/2025     Lab Results   Component Value Date    GLU 75 02/07/2025    BUN 11 02/07/2025     BUNCREA 14.3 02/07/2025    CREATSERUM 0.77 02/07/2025    ANIONGAP 11 02/07/2025    CA 9.1 02/07/2025    OSMOCALC 290 02/07/2025    ALKPHO 61 02/07/2025    AST 33 02/07/2025    ALT 23 02/07/2025    BILT 0.5 02/07/2025    TP 7.1 02/07/2025    ALB 4.9 (H) 02/07/2025    GLOBULIN 2.2 02/07/2025    AGRATIO 1.6 02/02/2024     02/07/2025    K 3.5 02/07/2025     02/07/2025    CO2 26.0 02/07/2025       Additional Labs:    07/2024   vitamin D 29 low  SERG 1: 320 nuclear dense fine speckled  dsDNA, chromatin, Bruce, Bruce/RNP, RNP, SSA, SSB, SCL 70, Laura 1, centromere negative  C3 103 normal  C4 20 normal  Cardiolipin IgA, IgG, IgM negative  Beta-2 glycoprotein IgA, IgG, IgM negative  RF IgA, IgG negative  RF IgM equivocal  MCV antibody negative  TPO antibody negative  CCP pending    12/2023  ESR 6 normal  CRP normal     02/2020  Uric acid 3.9 normal  ESR 6 normal   CRP normal   RF negative  CCP negative  HLAB27 negative  Lyme negative     03/2016  RF negative  SERG screen negative  ESR 7 normal    Anna Marie Elizabeth DO  [facility]  03/11/2025

## 2025-04-17 ENCOUNTER — LAB ENCOUNTER (OUTPATIENT)
Dept: LAB | Age: 25
End: 2025-04-17
Attending: INTERNAL MEDICINE
Payer: COMMERCIAL

## 2025-04-17 DIAGNOSIS — R74.8 ELEVATED CK: ICD-10-CM

## 2025-04-17 DIAGNOSIS — M79.7 FIBROMYALGIA: ICD-10-CM

## 2025-04-17 DIAGNOSIS — R53.82 CHRONIC FATIGUE: ICD-10-CM

## 2025-04-17 LAB — CK SERPL-CCNC: 100 U/L (ref 34–145)

## 2025-04-17 PROCEDURE — 82550 ASSAY OF CK (CPK): CPT | Performed by: INTERNAL MEDICINE

## 2025-06-10 ENCOUNTER — TELEPHONE (OUTPATIENT)
Facility: CLINIC | Age: 25
End: 2025-06-10

## 2025-06-10 RX ORDER — SODIUM, POTASSIUM,MAG SULFATES 17.5-3.13G
SOLUTION, RECONSTITUTED, ORAL ORAL
Qty: 1 KIT | Refills: 0 | OUTPATIENT
Start: 2025-06-10

## 2025-06-10 NOTE — TELEPHONE ENCOUNTER
I spoke to Jorge [facility] from [facility].  The pharmacy did not receive the suprep prescriptiion sent on 3/1/2025.  I phoned in the Rx.  Medication will be available in the afternoon.      Called and spoke to the patient, date of birth and name verified.  Informed of the above.  She was appreciative of the call.

## 2025-06-20 PROBLEM — R19.4 CHANGE IN BOWEL HABITS: Status: ACTIVE | Noted: 2025-06-20

## 2025-06-20 PROBLEM — K62.5 RECTAL BLEEDING: Status: ACTIVE | Noted: 2025-06-20

## 2025-06-20 PROCEDURE — 88305 TISSUE EXAM BY PATHOLOGIST: CPT | Performed by: INTERNAL MEDICINE

## 2025-06-27 ENCOUNTER — OFFICE VISIT (OUTPATIENT)
Facility: CLINIC | Age: 25
End: 2025-06-27
Payer: COMMERCIAL

## 2025-06-27 VITALS
WEIGHT: 112 LBS | DIASTOLIC BLOOD PRESSURE: 78 MMHG | SYSTOLIC BLOOD PRESSURE: 115 MMHG | BODY MASS INDEX: 20 KG/M2 | HEART RATE: 86 BPM

## 2025-06-27 DIAGNOSIS — K64.8 INTERNAL HEMORRHOIDS: ICD-10-CM

## 2025-06-27 DIAGNOSIS — R14.2 BELCHING: ICD-10-CM

## 2025-06-27 DIAGNOSIS — R13.19 ESOPHAGEAL DYSPHAGIA: ICD-10-CM

## 2025-06-27 DIAGNOSIS — K59.09 CHRONIC CONSTIPATION WITH OVERFLOW: Primary | ICD-10-CM

## 2025-06-27 PROCEDURE — 3078F DIAST BP <80 MM HG: CPT

## 2025-06-27 PROCEDURE — 3074F SYST BP LT 130 MM HG: CPT

## 2025-06-27 PROCEDURE — 99214 OFFICE O/P EST MOD 30 MIN: CPT

## 2025-06-27 RX ORDER — LINACLOTIDE 145 UG/1
145 CAPSULE, GELATIN COATED ORAL DAILY
Qty: 90 CAPSULE | Refills: 0 | Status: SHIPPED | OUTPATIENT
Start: 2025-06-27 | End: 2025-09-25

## 2025-06-27 RX ORDER — HYDROCORTISONE ACETATE 25 MG/1
25 SUPPOSITORY RECTAL 2 TIMES DAILY
Qty: 28 SUPPOSITORY | Refills: 0 | Status: SHIPPED | OUTPATIENT
Start: 2025-06-27 | End: 2025-07-11

## 2025-06-27 NOTE — PATIENT INSTRUCTIONS
-dicyclomine (bentyl) sparingly for cramping. This can worsen constipation     -use hydrocortisone suppositories 2x daily for 1 week     -can start linzess 145mcg for chronic constipation     -continue omeprazole 40mg daily     -follow up with [facility] esophagus specialist, consider ongoing tertiary care for complex symptoms/diagnoses

## 2025-06-27 NOTE — PROGRESS NOTES
[facility] - Gastroenterology                                                                                                               Reason for consult: dysphagia    Requesting physician or provider: Adela Soto    Chief Complaint   Patient presents with    Follow - Up     Colonoscopy follow up       HPI:   Serina Fay is a 24 year old year-old female with active diagnoses including ADHD, OCD, PTSD, +SERG, lichen sclerosis. Prior medical/surgical history in note table.    she is here today for follow up. Here today with Mom. She is undergoing work up for POTS, has appt with cardiology upcoming. +SERG of unknown cause. Possible connective tissue disorder, awaiting genetic testing. She is followed by multiple specialist: PCP, ortho, rheumatology, dermatology, allergist, otolaryngology.   Today:  #belching  #dysphagia  -since prior visit symptoms are unchanged. Reports for 1.5 years having symptoms of dysphagia and odynophagia. Reports daily dysphagia with liquids, solids and pills. Described as the proximal esophagus not relaxing to allow passage of solids/liquids. Also has odynophagia more sporadic, occurs maybe once a month.   -reports making an involuntary sound, can occur on empty stomach but more frequent after eating. She is unsure if originating from stomach like a belch or from vocal cords. Occurs about 5-6x a day. When taking ondansetron (zofran) no episodes for about 6 hours.   -intermittent food regurgitation during involuntary sound   -taking omeprazole 40mg daily, epigastric pain resolved with PPI  -s/p normal EGD and normal biopsy on 1/10/25. Dr. Cee advised esophageal manometry, has an appt with esophagus specialist at [facility] in July    #constipation  #diarrhea  -bowel habits are unchanged, still fluctuating between diarrhea and constipation.  -tried bentyl which helped with cramping, but  feeling more constipated   -eating a lot of fiber helps her the most  -increase in blood from hemorrhoids the past month, internal hemorrhoids noted on colonoscopy  -2 years ago more frequent urination, worse recently. Waking up 2-3x at night. Frequent pelvic pain.     Prior visit 2/14/25  #dysphagia  #food regurgitation  -since prior visit symptoms are mostly unchanged, food regurgitation is more frequent   -the omeprazole did help the epigastric pain but did not improve the dysphagia   -s/p normal EGD and normal biopsy on 1/10/25. Dr. Cee advised esophageal manometry  -she is having salivary gland biopsy with ENT on Tuesday     #constipation  #diarrhea  -still fluctuating between diarrhea and constipation. Now mostly diarrhea  -having 3-4 loose bowel movements daily. Sometimes small amount of mucus in stool   -no recent blood during bowel movement     Prior visit 12/26/2024  #odynophagia  #dysphagia  #chest tightness  #GERD  #vomiting  -reports for 1.5 years having symptoms of dysphagia and odynophagia. Reports daily dysphagia with liquids, solids and pills. Described as the proximal esophagus not relaxing to allow passage of solids/liquids. Also has odynophagia more sporadic, occurs maybe once a month.   -reports mostly constant chest tightness described as necessity to belch but unable to do so. Sometimes better with over the counter gas medication.  -frequent food or acid regurgitation and reports feeling like if she bends over her food would all regurgitate up. Frequent nausea which is somewhat better taking cimetidine nightly  -reports making an involuntary sound, can occur on empty stomach but more frequent after eating. She is unsure if originating from stomach like a belch or from vocal cords  -saw ENT and has laryngoscope and was told larynx is red & swollen. \"Notable interarytenoid edema/erythema \"  -reports she has seen her psychiatrist and neurologist and they don't feel like this related to  mental and neurological diagnoses  -she is currently being followed by rheumatology for work up of possible autoimmune condition    #constipation  #BRBPR  -reports chronic constipation, intermittent diarrhea. Eats certain foods such as excess sweet potato or blueberries to help her have bowel movement  -rare blood on tissue after straining to have bowel movement    Patient denies symptoms of nausea, vomiting, globus sensation, hematemesis, abdominal pain, change in bowel habits, or melena. she denies recent change in appetite, fever or unintentional weight loss.      Last colonoscopy:   6/20/25 Dr. Cee  Diminutive cecal polyp  2.  Small internal hemorrhoids  3.  Otherwise normal colonoscopy to the terminal ileum.  The patient's symptoms are likely due to the irritable bowel syndrome with a component of rectal dyssynergia    Last EGD:   1/10/2025 Dr. Cee   Normal examination of the upper digestive tract     A. Distal esophagus; biopsy:   Fragments of squamous esophageal mucosa showing no significant pathologic abnormalities.   No evidence of significant acute or chronic inflammatory infiltrates, epithelioid granulomas, markedly increased intraepithelial eosinophils (<1 eosinophil per high power field), viral inclusions, intestinal metaplasia, epithelial dysplasia, or malignancy identified.   No evidence of tissue-invasive fungal organisms, parasitic organisms, or  bacterial organisms consistent with Helicobacter species seen on Giemsa stain (with appropriate control reactivity).     B. Proximal esophagus; biopsy:  Fragments of squamous esophageal mucosa showing no significant pathologic abnormalities.   No evidence of significant acute or chronic inflammatory infiltrates, epithelioid granulomas, markedly increased intraepithelial eosinophils (<1 eosinophil per high power field), viral inclusions, intestinal metaplasia, epithelial dysplasia, or malignancy identified.   No evidence of tissue-invasive  fungal organisms, parasitic organisms, or  bacterial organisms consistent with Helicobacter species seen on Giemsa stain (with appropriate control reactivity).    NSAIDS: meloxicam prn   Tobacco: none   Alcohol: none   Marijuana: none   Illicit drugs: none     FH GI malignancy: unknown, adopted   FH IBD:  unknown, adopted     No history of adverse reaction to sedation  No DEBBIE  No anticoagulants/antiplatelet  No pacemaker/defibrillator    Wt Readings from Last 6 Encounters:   06/27/25 112 lb (50.8 kg)   06/10/25 109 lb (49.4 kg)   03/11/25 109 lb (49.4 kg)   02/10/25 107 lb (48.5 kg)   02/14/25 108 lb (49 kg)   02/10/25 107 lb (48.5 kg)        History, Medications, Allergies, ROS:      Past Medical History:    ADHD    Costochondritis    Migraine    OCD (obsessive compulsive disorder)    PTSD (post-traumatic stress disorder)      Past Surgical History:   Procedure Laterality Date    Biopsy of lip      Colonoscopy N/A 6/20/2025    Procedure: COLONOSCOPY;  Surgeon: Sebastián Cee MD;  Location: [facility] MAIN OR    Egd      Other surgical history      wisdom teeth      Family Hx:   Family History   Adopted: Yes      Social History:   Social History     Socioeconomic History    Marital status: Single   Tobacco Use    Smoking status: Never    Smokeless tobacco: Never   Vaping Use    Vaping status: Former    Substances: Nicotine   Substance and Sexual Activity    Alcohol use: Not Currently    Drug use: Never     Social Drivers of Health      Received from [facility]    Housing Stability        Medications (Active prior to today's visit):  Current Outpatient Medications   Medication Sig Dispense Refill    hydrocortisone 25 MG Rectal Suppos Place 1 suppository (25 mg total) rectally 2 (two) times daily for 14 days. 28 suppository 0    linaCLOtide (LINZESS) 145 MCG Oral Cap Take 145 mcg by mouth daily. 90 capsule 0    dicyclomine 10 MG Oral Cap Take 10 mg by mouth 4 (four) times daily before meals and  nightly.      Omeprazole 40 MG Oral Capsule Delayed Release Take 40 mg by mouth daily.      Meloxicam 15 MG Oral Tab Take 15 mg by mouth daily.      Multiple Vitamins-Minerals (MULTI FOR HER) Oral Tab Take by mouth.      ondansetron 4 MG Oral Tablet Dispersible Take 1 tablet (4 mg total) by mouth every 8 (eight) hours as needed for Nausea. 30 tablet 0    hydrOXYzine 25 MG Oral Tab Take 25 mg by mouth in the morning and 25 mg before bedtime.      fluticasone propionate 50 MCG/ACT Nasal Suspension       propranolol 10 MG Oral Tab Take 10 mg by mouth in the morning and 10 mg before bedtime.      Cholecalciferol (VITAMIN D) 50 MCG (2000 UT) Oral Tab Take by mouth. (Patient not taking: Reported on 6/27/2025)      Na Sulfate-K Sulfate-Mg Sulf (SUPREP BOWEL PREP KIT) 17.5-3.13-1.6 GM/177ML Oral Solution Split dose. Please follow the [facility] GI bowel prep instructions. (Patient not taking: Reported on 6/27/2025) 1 kit 0    amphetamine-dextroamphetamine 20 MG Oral Tab Take 1 tablet (20 mg total) by mouth. (Patient not taking: Reported on 6/27/2025)         Allergies:  Allergies[1]    ROS:   CONSTITUTIONAL: negative for fevers, chills, sweats  EYES Negative for scleral icterus or redness, and diplopia  HEENT: Negative for hoarseness  RESPIRATORY: Negative for cough and severe shortness of breath  CARDIOVASCULAR: Negative for crushing sub-sternal chest pain  GASTROINTESTINAL: See HPI  GENITOURINARY: Negative for dysuria  MUSCULOSKELETAL: Negative for arthralgias and myalgias  SKIN: Negative for jaundice, rash or pruritus  NEUROLOGICAL: Negative for dizziness and headaches  BEHAVIOR/PSYCH: Negative for psychotic behavior    PHYSICAL EXAM:   Blood pressure 115/78, pulse 86, weight 112 lb (50.8 kg), last menstrual period 06/09/2025.    GEN: Alert, no acute distress, well-nourished   HEENT: anicteric sclera, neck supple, trachea midline, MMM, no palpable or tender neck or supraclavicular lymph nodes  CV: RRR, the  extremities are warm and well perfused   LUNGS: No increased work of breathing, CTAB  ABDOMEN: Soft, symmetrical, non-tender without distention or guarding. No scars or lesions. Aorta is without bruit or visible pulsation. Umbilicus is midline without herniation. Normoactive bowel sounds are present, No masses, hepatomegaly or splenomegaly noted.  MSK: No erythema, no warmth, no swelling of joints  SKIN: No jaundice, no erythema, no rashes, no lesions  HEMATOLOGIC: No bleeding, no bruising  NEURO: Alert and interactive, PRATER  PSYCH: appropriate mood & affect    Labs/Imaging/Procedures:     Patient's pertinent labs and imaging were reviewed and discussed with patient today.        .  ASSESSMENT/PLAN:   Serina Fay is a 24 year old year-old female with active diagnoses including ADHD, OCD, PTSD, +SERG, lichen sclerosis. Prior medical/surgical history in note table.    she is here today for follow up. Here today with Mom. She is undergoing work up for POTS, has appt with cardiology upcoming. +SERG of unknown cause. Possible connective tissue disorder, awaiting genetic testing. She is followed by multiple specialist: PCP, ortho, rheumatology, dermatology, allergist, otolaryngology.   Today:  #belching  #dysphagia  -since prior visit symptoms are unchanged. Reports for 1.5 years having symptoms of dysphagia and odynophagia. Reports daily dysphagia with liquids, solids and pills. Described as the proximal esophagus not relaxing to allow passage of solids/liquids. Also has odynophagia more sporadic, occurs maybe once a month.   -reports making an involuntary sound, can occur on empty stomach but more frequent after eating. She is unsure if originating from stomach like a belch or from vocal cords. Occurs about 5-6x a day. When taking ondansetron (zofran) no episodes for about 6 hours.   -intermittent food regurgitation during involuntary sound   -taking omeprazole 40mg daily, epigastric pain resolved with PPI  -s/p  normal EGD and normal biopsy on 1/10/25. Dr. Cee advised esophageal manometry, has an appt with esophagus specialist at [facility] in July  -plan: continue omeprazole 40mg daily and ondansetron (zofran) prn    #constipation  #diarrhea  -bowel habits are unchanged, still fluctuating between diarrhea and constipation.  -tried bentyl which helped with cramping, but feeling more constipated   -eating a lot of fiber helps her the most  -increase in blood from hemorrhoids the past month, internal hemorrhoids noted on colonoscopy  -2 years ago more frequent urination, worse recently. Waking up 2-3x at night. Frequent pelvic pain.   -suspect constipation with overflow diarrhea. She reports finally feeling relief when she drank bowel prep prior to colonoscopy. Recommend linzess for management of constipation. Hydrocortisone supp for bleeding hemorrhoids.     We discussed due to medical complexity of multiple GI and non-GI symptoms, multiple specialists referral, refractory symptoms of unknown origin tertiary care at mostly one facility would be optimal for her own continuity of care and work up of complex diagnoses.       Recommendations:  -dicyclomine (bentyl) sparingly for cramping. This can worsen constipation     -use hydrocortisone suppositories 2x daily for 1 week     -can start linzess 145mcg for chronic constipation     -continue omeprazole 40mg daily     -follow up with [facility] esophagus specialist, consider ongoing tertiary care for complex symptoms/diagnoses          Orders This Visit:  No orders of the defined types were placed in this encounter.      Meds This Visit:  Requested Prescriptions     Signed Prescriptions Disp Refills    hydrocortisone 25 MG Rectal Suppos 28 suppository 0     Sig: Place 1 suppository (25 mg total) rectally 2 (two) times daily for 14 days.    linaCLOtide (LINZESS) 145 MCG Oral Cap 90 capsule 0     Sig: Take 145 mcg by mouth daily.       Imaging & Referrals:  None      Nida Borja  MALINA    [facility] Gastroenterology  6/27/2025        This note was partially prepared using Dragon Medical voice recognition dictation software. As a result, errors may occur. When identified, these errors have been corrected. While every attempt is made to correct errors during dictation, discrepancies may still exist.          [1] No Known Allergies

## 2025-06-30 ENCOUNTER — TELEPHONE (OUTPATIENT)
Facility: CLINIC | Age: 25
End: 2025-06-30

## 2025-06-30 NOTE — TELEPHONE ENCOUNTER
Current Outpatient Medications   Medication Sig Dispense Refill    linaCLOtide (LINZESS) 145 MCG Oral Cap Take 145 mcg by mouth daily. 90 capsule 0     Key: [id number]

## 2025-07-01 NOTE — TELEPHONE ENCOUNTER
Unable to complete prior authorization for Linzess thru cover my meds.  I spoke to Tommy garcia, [facility] [employer] [phone] P  She stated Linzess does not require a prior authorization, co pay would be $5.    I spoke to federica Fong from [facility].  Gave insurance info and claim was accepted.  Their system will notify the patient when medication is ready for .    Called and spoke to the patient, date of birth and name verified.  Informed of the above.  She was appreciative of the call.